# Patient Record
Sex: MALE | Race: WHITE | NOT HISPANIC OR LATINO | Employment: FULL TIME | URBAN - METROPOLITAN AREA
[De-identification: names, ages, dates, MRNs, and addresses within clinical notes are randomized per-mention and may not be internally consistent; named-entity substitution may affect disease eponyms.]

---

## 2021-03-05 ENCOUNTER — HOSPITAL ENCOUNTER (OUTPATIENT)
Dept: OTHER | Facility: HOSPITAL | Age: 41
Discharge: HOME | End: 2021-03-05
Attending: ORTHOPAEDIC SURGERY

## 2022-07-10 ENCOUNTER — HOSPITAL ENCOUNTER (EMERGENCY)
Facility: HOSPITAL | Age: 42
Discharge: HOME | End: 2022-07-10
Attending: EMERGENCY MEDICINE | Admitting: EMERGENCY MEDICINE

## 2022-07-10 VITALS
BODY MASS INDEX: 34.58 KG/M2 | TEMPERATURE: 97.3 F | SYSTOLIC BLOOD PRESSURE: 146 MMHG | HEART RATE: 103 BPM | RESPIRATION RATE: 18 BRPM | WEIGHT: 228.18 LBS | DIASTOLIC BLOOD PRESSURE: 87 MMHG | HEIGHT: 68 IN | OXYGEN SATURATION: 97 %

## 2022-07-10 DIAGNOSIS — H60.331 ACUTE SWIMMER'S EAR OF RIGHT SIDE: Primary | ICD-10-CM

## 2022-07-10 PROCEDURE — 99282 EMERGENCY DEPT VISIT SF MDM: CPT

## 2022-07-10 PROCEDURE — 99283 EMERGENCY DEPT VISIT LOW MDM: CPT

## 2022-07-10 PROCEDURE — 69210 REMOVE IMPACTED EAR WAX UNI: CPT

## 2022-07-10 NOTE — ED PROVIDER NOTES
"Time: 10:59 AM EDT  Arrived by: private car  Chief Complaint: Ear Drainage  History provided by: Patient  History is limited by: No limitations     History of Present Illness:  Patient is a 42 y.o. year old male who presents to the emergency department with drainage from his right ear.     Patient presents to the ED today for evaluation of drainage from his right ear. He states that he has been using OTC ear drops but he continues to have bleeding from his right ear. He believes he may have swimmer's ear. He denies any erythema of his ear canal but states that he has a \"crackling, popping\" sound in his ear. Patient does mention that he vomited once prior to his symptom onset, but he has not had any vomiting since that time.       History provided by:  Patient   used: No        Similar Symptoms Previously: No  Recently seen: No      Patient Care Team  Primary Care Provider: Jocelyn Moreno MD    Past Medical History:     Allergies   Allergen Reactions   • Morphine GI Bleeding     Past Medical History:   Diagnosis Date   • Diabetes mellitus (HCC)      Past Surgical History:   Procedure Laterality Date   • SHOULDER SURGERY Left      History reviewed. No pertinent family history.    Home Medications:  Prior to Admission medications    Not on File        Social History:   Social History     Tobacco Use   • Smoking status: Never Smoker   • Smokeless tobacco: Current User     Types: Chew   • Tobacco comment: for 20+ yrs   Substance Use Topics   • Alcohol use: Yes     Comment: occasionally   • Drug use: Defer     Recent travel: not applicable     Review of Systems:  Review of Systems   Constitutional: Negative for chills and fever.   HENT: Positive for ear pain (Drainage from right ear). Negative for congestion and sore throat.    Eyes: Negative for pain.   Respiratory: Negative for cough, chest tightness and shortness of breath.    Cardiovascular: Negative for chest pain.   Gastrointestinal: " "Negative for abdominal pain, diarrhea, nausea and vomiting.   Genitourinary: Negative for flank pain and hematuria.   Musculoskeletal: Negative for joint swelling.   Skin: Negative for pallor.   Neurological: Negative for seizures and headaches.   All other systems reviewed and are negative.       Physical Exam:  /87 (BP Location: Left arm, Patient Position: Sitting)   Pulse 103   Temp 97.3 °F (36.3 °C) (Oral)   Resp 18   Ht 172.7 cm (68\")   Wt 104 kg (228 lb 2.8 oz)   SpO2 97%   BMI 34.69 kg/m²     Physical Exam  Vitals and nursing note reviewed.   Constitutional:       General: He is not in acute distress.     Appearance: Normal appearance. He is not toxic-appearing.   HENT:      Head: Normocephalic and atraumatic.      Left Ear: Hearing, tympanic membrane, ear canal and external ear normal.      Ears:      Comments: Right ear noted to have thick, white \"cheesy\" discharge in the canal. No erythema or edema of the canal but spots of red blood noted. No pain with palpation of the right ear auricle or tragus.      Mouth/Throat:      Mouth: Mucous membranes are moist.   Eyes:      General: No scleral icterus.  Cardiovascular:      Rate and Rhythm: Normal rate and regular rhythm.      Pulses: Normal pulses.      Heart sounds: Normal heart sounds.   Pulmonary:      Effort: Pulmonary effort is normal. No respiratory distress.      Breath sounds: Normal breath sounds.   Abdominal:      General: Abdomen is flat.      Palpations: Abdomen is soft.      Tenderness: There is no abdominal tenderness.   Musculoskeletal:         General: Normal range of motion.      Cervical back: Normal range of motion and neck supple.   Skin:     General: Skin is warm and dry.   Neurological:      Mental Status: He is alert and oriented to person, place, and time. Mental status is at baseline.                Medications in the Emergency Department:  Medications - No data to display     Labs  Lab Results (last 24 hours)     ** No " results found for the last 24 hours. **           Imaging:  No Radiology Exams Resulted Within Past 24 Hours    Procedures:  Procedures    Progress                            Medical Decision Making:  MDM       This patient is a healthy-appearing 42-year-old male who is been having some fullness and decreased hearing in the right ear lately and figured he might of had swimmer's ear and started putting in some eardrops earlier in the week.    Yesterday, he got nauseated after eating a corn dog at a fair and vomited, and shortly thereafter noticed some red blood coming from the right ear canal and some drainage.    On my initial exam of the right ear canal there is a thick whitish cheesy appearing drainage and small spots of blood present.    We had our ED nurse clear out any drainage from the right ear canal using curette, and will reexamine.      On my reassessment via otoscopy of the right ear canal, he does have some discomfort during otoscopy in the ear canal and I note some canal edema and some whitish cheesy discharge and I am able to visualize some of the TM and do not see a perforation or erythema.    This exam following irrigation is mostly consistent with otitis externa, and I am starting him on some Cortisporin otic suspension and will have him follow-up with ENT.    Final diagnoses:   Acute swimmer's ear of right side        Disposition:  ED Disposition     ED Disposition   Discharge    Condition   Stable    Comment   --             This medical record created using voice recognition software.             Thai Sevilla  07/10/22 1209       Burke Crump MD  07/10/22 1231       Burke Crump MD  07/10/22 1233       Burke Crump MD  07/10/22 1242

## 2022-07-10 NOTE — DISCHARGE INSTRUCTIONS
Use the eardrops a few times a day this week to treat your ear canal infection, and you can call tomorrow for a follow-up appointment to see ENT in case your symptoms do not improve.

## 2022-10-13 ENCOUNTER — TRANSCRIBE ORDERS (OUTPATIENT)
Dept: ADMINISTRATIVE | Facility: HOSPITAL | Age: 42
End: 2022-10-13

## 2022-10-13 ENCOUNTER — LAB (OUTPATIENT)
Dept: LAB | Facility: HOSPITAL | Age: 42
End: 2022-10-13

## 2022-10-13 DIAGNOSIS — G89.4 CHRONIC PAIN SYNDROME: ICD-10-CM

## 2022-10-13 DIAGNOSIS — G89.4 CHRONIC PAIN SYNDROME: Primary | ICD-10-CM

## 2022-10-13 PROCEDURE — 85613 RUSSELL VIPER VENOM DILUTED: CPT

## 2022-10-13 PROCEDURE — 86200 CCP ANTIBODY: CPT

## 2022-10-13 PROCEDURE — 86160 COMPLEMENT ANTIGEN: CPT

## 2022-10-13 PROCEDURE — 36415 COLL VENOUS BLD VENIPUNCTURE: CPT

## 2022-10-13 PROCEDURE — 85652 RBC SED RATE AUTOMATED: CPT

## 2022-10-13 PROCEDURE — 85025 COMPLETE CBC W/AUTO DIFF WBC: CPT

## 2022-10-13 PROCEDURE — 86225 DNA ANTIBODY NATIVE: CPT

## 2022-10-13 PROCEDURE — 86038 ANTINUCLEAR ANTIBODIES: CPT

## 2022-10-13 PROCEDURE — 86235 NUCLEAR ANTIGEN ANTIBODY: CPT

## 2022-10-13 PROCEDURE — 85670 THROMBIN TIME PLASMA: CPT

## 2022-10-13 PROCEDURE — 85705 THROMBOPLASTIN INHIBITION: CPT

## 2022-10-13 PROCEDURE — 86431 RHEUMATOID FACTOR QUANT: CPT

## 2022-10-13 PROCEDURE — 86140 C-REACTIVE PROTEIN: CPT

## 2022-10-13 PROCEDURE — 85732 THROMBOPLASTIN TIME PARTIAL: CPT

## 2022-10-14 LAB
APTT SCREEN TO CONFIRM RATIO: 0.97 RATIO (ref 0–1.34)
BASOPHILS # BLD AUTO: 0.05 10*3/MM3 (ref 0–0.2)
BASOPHILS NFR BLD AUTO: 0.9 % (ref 0–1.5)
C3 SERPL-MCNC: 148 MG/DL (ref 82–167)
C4 SERPL-MCNC: 30 MG/DL (ref 14–44)
CHROMATIN AB SERPL-ACNC: <10 IU/ML (ref 0–14)
CONFIRM APTT/NORMAL: 39.2 SEC (ref 0–47.6)
CRP SERPL-MCNC: 0.56 MG/DL (ref 0–0.5)
DEPRECATED RDW RBC AUTO: 44.9 FL (ref 37–54)
DSDNA AB SER-ACNC: <1 IU/ML (ref 0–9)
ENA SS-A AB SER-ACNC: <0.2 AI (ref 0–0.9)
ENA SS-B AB SER-ACNC: <0.2 AI (ref 0–0.9)
EOSINOPHIL # BLD AUTO: 0.1 10*3/MM3 (ref 0–0.4)
EOSINOPHIL NFR BLD AUTO: 1.9 % (ref 0.3–6.2)
ERYTHROCYTE [DISTWIDTH] IN BLOOD BY AUTOMATED COUNT: 12.3 % (ref 12.3–15.4)
ERYTHROCYTE [SEDIMENTATION RATE] IN BLOOD: 15 MM/HR (ref 0–15)
HCT VFR BLD AUTO: 42.8 % (ref 37.5–51)
HGB BLD-MCNC: 14.2 G/DL (ref 13–17.7)
IMM GRANULOCYTES # BLD AUTO: 0.02 10*3/MM3 (ref 0–0.05)
IMM GRANULOCYTES NFR BLD AUTO: 0.4 % (ref 0–0.5)
LA 2 SCREEN W REFLEX-IMP: NORMAL
LYMPHOCYTES # BLD AUTO: 0.93 10*3/MM3 (ref 0.7–3.1)
LYMPHOCYTES NFR BLD AUTO: 17.6 % (ref 19.6–45.3)
MCH RBC QN AUTO: 32.8 PG (ref 26.6–33)
MCHC RBC AUTO-ENTMCNC: 33.2 G/DL (ref 31.5–35.7)
MCV RBC AUTO: 98.8 FL (ref 79–97)
MONOCYTES # BLD AUTO: 0.47 10*3/MM3 (ref 0.1–0.9)
MONOCYTES NFR BLD AUTO: 8.9 % (ref 5–12)
NEUTROPHILS NFR BLD AUTO: 3.7 10*3/MM3 (ref 1.7–7)
NEUTROPHILS NFR BLD AUTO: 70.3 % (ref 42.7–76)
NRBC BLD AUTO-RTO: 0 /100 WBC (ref 0–0.2)
PLATELET # BLD AUTO: 209 10*3/MM3 (ref 140–450)
PMV BLD AUTO: 11.5 FL (ref 6–12)
RBC # BLD AUTO: 4.33 10*6/MM3 (ref 4.14–5.8)
SCREEN APTT: 38.8 SEC (ref 0–51.9)
SCREEN DRVVT: 37.4 SEC (ref 0–47)
THROMBIN TIME: 17.2 SEC (ref 0–23)
WBC NRBC COR # BLD: 5.27 10*3/MM3 (ref 3.4–10.8)

## 2022-10-15 LAB — CCP IGA+IGG SERPL IA-ACNC: 7 UNITS (ref 0–19)

## 2022-10-17 LAB
ANA HOMOGEN TITR SER: ABNORMAL {TITER}
ANA SER QL IF: POSITIVE
ANA SPECKLED TITR SER: ABNORMAL {TITER}
Lab: ABNORMAL

## 2023-10-25 PROBLEM — G56.02 CARPAL TUNNEL SYNDROME OF LEFT WRIST: Status: ACTIVE | Noted: 2023-10-25

## 2023-10-25 NOTE — H&P
Orthopaedic & Hand Surgery  H&P Update  Dr. Doyle Alexandre  [phone]    Name: Abelardo Johnson   : 1980     Date: 10/27/23   Time: 17:11 EDT    ------  This document is the preoperative History and Physical and is an extension of the last clinic note that is copied below.  ------    I have reviewed the patient's prior notes, interviewed and examined the patient on the day of surgery in the holding room.  The patient's condition has not changed, there are no new treatments available that obviate the need for surgery. The need for surgery still exists.  I have reviewed the procedure with the patient, answered any last-minute questions and have personally marked the operative site.    Physical exam this morning is unchanged from prior with the addition of:   CV: Regular rate and rhythm.    Respiratory: Non-labored breathing.     Medications:  No current facility-administered medications on file prior to encounter.     Current Outpatient Medications on File Prior to Encounter   Medication Sig Dispense Refill    clotrimazole-betamethasone (LOTRISONE) 1-0.05 % cream apply topically to the affected area twice a day 30 g 0    Continuous Blood Gluc Sensor (Dexcom G6 Sensor) Apply 1 application topically to the appropriate area as directed Every 10 (Ten) Days. 3 each 5    Continuous Blood Gluc Sensor (Dexcom G6 Sensor) use as directed every day for 10 days 3 each 1    diclofenac (VOLTAREN) 50 MG EC tablet take 1 tablet by mouth twice daily 60 tablet 0    DULoxetine (CYMBALTA) 60 MG capsule Take 1 capsule by mouth Daily. 30 capsule 0    insulin detemir (Levemir) 100 UNIT/ML injection Inject 95 Units under the skin into the appropriate area as directed Daily. 90 mL 3    insulin lispro (HumaLOG) 100 UNIT/ML injection Inject 50 Units under the skin into the appropriate area as directed. (Patient taking differently: Inject 50 Units under the skin into the appropriate area as directed 3 (Three) Times a Day Before  "Meals.) 90 mL 0    Insulin Syringe-Needle U-100 (TRUEplus Insulin Syringe) 31G X 5/16\" 1 ML misc 1 application 4 (Four) Times a Day. 400 each 0    Insulin Syringe-Needle U-100 31G X 5/16\" 0.3 ML misc inject 1 under the skin as directed 4 times a day 1440 each 0    lamoTRIgine (LaMICtal) 100 MG tablet Take 2 tablets by mouth Daily.      lisinopril (PRINIVIL,ZESTRIL) 5 MG tablet Take 1 tablet by mouth daily. 90 tablet 1    methocarbamol (ROBAXIN) 750 MG tablet Take 1 tablet by mouth 3 times a day for 30 days. 90 tablet 1    rosuvastatin (CRESTOR) 40 MG tablet Take 1/2 tablet by mouth Every Night. 45 tablet 1    sildenafil (VIAGRA) 100 MG tablet take 1 tablet by mouth once daily as needed for erectile dysfunction 20 tablet 0    valACYclovir (VALTREX) 1000 MG tablet Take 1 tablet by mouth daily. 90 tablet 1       Allergies:  Allergies   Allergen Reactions    Morphine GI Bleeding       Past Medical History:  Patient Active Problem List   Diagnosis    Carpal tunnel syndrome of left wrist       Family History:  Family History   Problem Relation Age of Onset    Malig Hyperthermia Neg Hx          Review of Systems - Negative except as stated in the HPI    The most recent clinic note (with his HPI and indications for surgery today) is pasted below:    Name JACKIE BEAVER (44yo, M) ID# [id number] Appt. Date/Time 2023 01:50PM   1980 Service Dept[facility]  Provider MARY ELLEN PEREZ MD  Insurance   Med Primary[id number]MED[place]AC[place] - [place]MO)  Insurance # : [id number]  Policy/Group # : [id number]  Prescription: check now  Chief Complaint  Bilateral wrist pain  Patient's Pharmacies  [id number] ([facility]): [facility] [place] [address], Ph [phone], Fax [phone]  Vitals  2023 13:40  Ht: 5 ft 6.5 in  Wt: 218 lbs  BMI: 34.7  Body Surface Area: 2.15 m²  Allergies  Reviewed Allergies  MORPHINE: - morphine iv   Medications  Reviewed Medications  diclofenac " sodium 50 mg tablet,delayed release  Take 1 tablet(s) twice a day by oral route.  09/25/23   entered Doyle Alexandre MD  DULoxetine 60 mg capsule,delayed release  Take 1 capsule(s) every day by oral route.  09/25/23   entered Doyle Alexandre MD  HumaLOG KwikPen Insulin  09/25/23   entered Doyle Alexandre MD  lamoTRIgine  09/25/23   entered Doyle Alexandre MD  Levemir FlexPen 100 unit/mL (3 mL) solution subcutaneous insulin pen  Inject by subcutaneous route.  09/25/23   entered Doyle Alexandre MD  lisinopriL  09/25/23   entered Doyle Alexandre MD  methocarbamoL  09/25/23   entered Doyle Alexandre MD  pregabalin 150 mg capsule  Take 1 capsule(s) twice a day by oral route.  09/25/23   entered Doyle Alexandre MD  rosuvastatin 20 mg tablet  Take 1 tablet(s) every day by oral route.  09/25/23   entered Doyle Alexandre MD  valACYclovir  09/25/23   entered Doyle Alexandre MD  Vaccines  None recorded.  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 - Onset: 09/25/2023  Family History  Reviewed Family History  Father - No current problems or disability  Mother - No current problems or disability  Social History  Reviewed Social History  Public Health and Travel  Have you recently traveled abroad?: No  Have you been to an area known to be high risk for COVID-19?: No  In the 14 days before symptom onset, have you had close contact with a laboratory-confirmed COVID-19 while that case was ill?: No  In the 14 days before symptom onset, have you had close contact with a person who is under investigation for COVID-19 while that person was ill?: No  Substance Use  Do you or have you ever smoked tobacco?: Current every day smoker  How much tobacco do you smoke?: None  How many years have you smoked tobacco?: 1  At what age did you start smoking tobacco?:  "43  Do you or have you ever used any other forms of tobacco or nicotine?: Yes  Do you or have you ever used e-cigarettes or vape?: Never used electronic cigarettes  Do you or have you ever used smokeless tobacco?: Former smokeless tobacco user  How many years have you used smokeless tobacco?: 16  Has tobacco cessation counseling been provided?: No  What is your level of alcohol consumption?: None  Do you use any illicit or recreational drugs?: No  Advance Directive  Do you have an advance directive?: No  Do you have a medical power of ?: No  Surgical History  Reviewed Surgical History  Shoulder Surgery - 05/15/2021  Past Medical History  Reviewed Past Medical History  Anxiety Disorder: Y  Arthritis: Y  Depression: Y  Diabetes: Y  Fibromyalgia: Y  Headaches/Migraines: Y  Mental Illness: Y  HPI  44y/o right-hand-dominant male c/o bilateral hand numbness and stiffness    History obtained 9/25/2023:    Onset: 3 years prior  Location: Bilateral hands  Timing: Constant, worse at night, worse with activity and worse in the morning  Quality: Numbness and paresthetic pain with weakness of , decreased range of motion and stiffness.  Severity: 4/10 at best, 9/10 at worst  Modifying factors: Mildly improved with gabapentin (however, he notes that this makes him a \"zombie\") and diclofenac. He is trialed cock-up wrist bracing without improvement. Is undergone several sessions of hand therapy but did not find significant benefit (currently continues with hand exercises at home utilizing Theraputty). He has undergone corticosteroid injection on 5/18/2023 of the left carpal tunnel without significant improvement.  Context: He is diabetic with the most recent hemoglobin A1c of 7.5. He notes this was previously below 7, however, he is going through a divorce with increased family stressors. Symptoms include significant stiffness in his hands together with burning pain and numbness. He was seen and evaluated by Dr. Soler" Robert ([facility]) and diagnosed with a combination of diabetic cheiroarthropathy as well as left carpal tunnel syndrome (supported in part by electrodiagnostic studies). Due to a failure of conservative modalities, carpal tunnel release was considered, however, Dr. Jones noted that there is a guarded prognosis given the presence of hand stiffness and longstanding diabetes. He suggested a second opinion with me which the patient accepted.    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 He underwent shoulder surgery in May 2021 and experienced worsening numbness in his left arm after the block and surgery. He notes that due to difficulty with lifting following surgery, he lost his job and he is currently unemployed. He is partially supported by his mother and his chart. He is unable to pursue hobbies due to the pain in his hands, however, he notes that he previously enjoyed hunting, fishing and driving.    He notes he lives remotely, approximately 90 minutes of [place], and has limited access to therapy.  ROS  Patient reports arthralgias/joint pain.  Physical Exam  General:  The patient's general appearance was well-nourished, well-hydrated, no acute distress.  Orientation was alert and conversant    Musculoskeletal: Bilateral hands and wrists  Inspection: See clinical images below. He has visible Dupuytren's contracture bilaterally, worse on the left with extension predominantly into the ring and small finger. Skin is intact, warm and well perfused.  Palpation: Tender with squeeze at the level of the MP joints of the thumb and long finger bilaterally. Equivocal tenderness in the index and ring fingers bilaterally. Not tender in the small finger bilaterally. Not notably tender at the interphalangeal joints.  Sensation: Diminished bilaterally in both median and ulnar nerve distributions when compared with radial, he does not split the ring finger with regard to sensitivity. On the  right, he is more diminished in the median nerve distribution. On the left, he appears more diminished in the ring and small finger.  Orrum Kimi monofilament testing:  (Normal: 0.07 g, diminished: 0.4 g, diminished protective: 2 g, lost protective: 4 g, pressure only: 300 g)    Thumb: 10/2  Index: 10/2  Lon/2  Rin.4/10  Small: 0.4/2  Motor: 5/5 strength in thenar and abductor digiti minimi bilaterally. Able to flex, extend and abduct the fingers. Able to abduct the thumb orthogonal to the plane of the palm.  Vascular: Brisk cap refill to all digits and 2+ radial pulse. Modified Wayne's test not obtained due to inability to exsanguinate the palm bilaterally  Joint stability and range of motion: On attempted composite fist, the fingers come to approximately 1.5 cm from the distal palmar crease bilaterally. When he attempts to open them, they come to approximately 30 % of a normal arc of motion with residual stiffness. Able to oppose the thumb to the tip of the long finger bilaterally. Wrist range of motion in extension/flexion/pronation/supination is: Right side: 50° / 70°/full/65°. Left side: 45°/70°/85°/70°  Other: Tinel's at the wrist is positive bilaterally. Phalen's test is positive bilaterally. Tinel's over Au's ligament is negative bilaterally. Elbow flexion test is positive bilaterally. He has difficulty placing the hands flat on the table    CTS-6 Score: Right/left    1) Numbness predominantly or exclusively in median nerve territory (3.5): 0/0  2) Nocturnal numbness (4): 4/4  3) Thenar atrophy and/or weakness (5): 0/0  4) Positive Phalen's test (5): 5/5  5) Loss of two-point discrimination (4.5): 4.5/4.5  6) Positive Tinel sign (4): 4/4  Total: 17.5/17.5 out of 26  (>12 has 80% probability of carpal tunnel syndrome; greater than 5 has 25% probability of carpal tunnel syndrome)  Assessment / Plan  43-year-old right-hand-dominant male with:    1. Bilateral wrist carpal tunnel syndrome,  diagnosis by electrodiagnostic study on the left, history and physical exam. With minimal improvement following corticosteroid injection on the left. Also with a history of fairly significant diabetes with a likely component of diabetic neuropathy together with Cheiroarthropathy. In addition, on the left side, he had increased neuropathic pain following a block for shoulder arthroscopy. He has trialed gabapentin but without significant relief. We reviewed the condition today, including relevant anatomy, natural history and treatment options both conservative and surgical. Given that he has conservative modalities as noted above with incomplete remission, I recommended additional treatment. Given his history of diabetes, I would not recommend further attempts at steroid injection due to the risks of elevated blood glucose. Recommendation, therefore, is for surgical release given risk of irreversible nerve damage which can cause weakness in the thenar muscle groups. A thorough discussion of the risks, benefits and alternatives of this procedure ensued. Following our discussion, questions were solicited and answered to his satisfaction. He voiced an understanding of the nature of the condition, the indicated procedure, the risks and the alternatives. He requested surgery, beginning on the left and proceeding later to the right side in a staged manner after an interval of recuperation. Verbal consent was obtained. In keeping with facility practice, written consent will be obtained on the day of procedure.    It should be noted that he also has some concern for cubital tunnel syndrome. For now, I I am inclined to focus on carpal tunnel release as it can be performed under local anesthetic alone, given his prior complications from a nerve block. If he is having persistent paresthetic symptoms in the small finger, consideration for further treatment of cubital tunnel syndrome would be reasonable.    2. Diabetic  cheiroarthropathy, bilateral. We discussed the nature of the condition today, including the relevant anatomy, natural history and treatment options, focusing on conservative modalities. My recommendation is to continue with hand therapy exercises with gentle and relentless range of motion exercises. It is possible that after carpal tunnel release, he may experience some improvement if the confined space of the flexor tendons and median nerve is enlarged.    3. Bilateral hand Dupuytren's contracture, mild in nature with a greater degree of the contracture in his hands due to the above. For now, I recommended observation.    1. Bilateral wrist pain  M25.531: Pain in right wrist  M25.532: Pain in left wrist  XR, WRIST, 3 OR MORE VIEW  Side: BILATERAL    2. Bilateral carpal tunnel syndrome  G56.03: Carpal tunnel syndrome, bilateral upper limbs    3. 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 Dupuytren's contracture  M72.0: Palmar fascial fibromatosis [Dupuytren]    XR, WRIST, 3 OR MORE VIEW  Side: BILATERAL  Result:  - XRAY WRIST 3+ VIEW: Performed  Result Note: Radiographs of bilateral wrists (4 views) were obtained today and reviewed by me. My impression is: No acute osseous abnormalities identified. No degenerative changes noted. Alignment is normal. No foreign body seen.  Patient Instructions  1. Plan for surgery as noted below:  - Diagnosis: Bilateral carpal tunnel syndrome  - Proposed surgery: Left carpal tunnel release (CPT 41331)  - Special considerations/equipment: [To be performed under WALANT technique (local alone)]  - Risks of surgery: Pain, bleeding, infection (increased due to diabetes), damage to nerves, blood vessels or other surrounding structures, incomplete release and/or recurrence of the condition, stiffness, scar, further procedures, unforeseen risks of surgery including stroke and death.  -Alternatives to surgery: No  surgery, bracing, injections    2. He will need postoperative evaluation at [10 to 14 days] for wound inspection and suture removal.  3. Continue with gentle finger range of motion exercises as previously demonstrated in therapy.    Return to Office  to see Doyle Alexandre MD at [facility] on or around 09/25/2023  Encounter Sign-Off  Encounter signed-off by Doyle Alexandre MD, 09/25/2023.  Encounter performed and documented by Doyle Alexandre MD  Encounter reviewed & signed by Doyle Alexandre MD on 09/25/2023 at 9:57pm    -----    I have evaluated the patient and determined that he is stable and cleared for surgery in the ambulatory setting.      Doyle Alexandre MD, PhD  Orthopaedic & Hand Surgery  [facility]  [phone] - [facility]  [phone] - [facility]

## 2023-10-25 NOTE — DISCHARGE INSTRUCTIONS
Orthopaedic & Hand Surgery  Carpal Tunnel Release Discharge Instructions  Dr. Doyle Alexandre  [phone]    INCISION CARE  Wash your hands prior to dressing changes  The postoperative dressings should be taken off starting on postoperative day #4. New dry dressings can then be placed around the wound and held in place with an Ace wrap. Dressings should be changed daily.  No creams or ointments to the incision until 3 weeks post op.   It is okay to wash the incision with clean water (water you would drink) and soap but do not scrub. Do not soak your incision. After after showering or washing her hands, pat the wound dry gently and cover with new dry dressings.  Do not touch or pick at the incision  Check incision every day and notify surgeon immediately if any of the following signs or symptoms are seen:  Increase in redness  Increase in swelling around the incision and of the entire extremity  Increase in pain  NEW drainage or oozing from the incision  Pulling apart of the edges of the incision  Increase in overall body temperature (greater than 100.4°F)  Your surgeon will instruct you regarding suture or staple removal. In general, this happens at the 1-2-week post op appointment.    ACTIVITIES  Exercises:  Physical therapy may or may not be needed after surgery. Once some healing has occurred, it will be possible to start therapy if you wish. However, most patients get their function back fairly well after surgery without formal therapy.  Activities of Daily Living:   Showering may begin immediately if the wrist can be protected. The splint and incision cannot get wet after surgery.   No tub baths, hot tubs, or swimming pools for 4 weeks.  May shower and let water run over the incision once the sutures are removed if there is no drainage and the wound is well healing. A new dry dressing can be applied after showering.     Restrictions  Weight: Do not put weight on the wrist until instructed to do so. Your  surgeon will discuss with restrictions in terms of activities allowed. In general, anything more strenuous than holding a pen or piece of paper should be avoided, the other wrist should do the vast majority of the work until the soft tissues have healed enough that it is not painful, then it is ok to use the wrist more strenuously.   Driving: Many patients have questions about when it is safe to return to driving. The answer is that this is extremely variable. It depends on how quickly you heal. Until you can safely navigate the steering wheel, and are off of all narcotics, driving is not permitted. Your surgeon cannot “clear” you to return to driving, only you can make the decision when you feel it is safe.     Pain Control  Ice:  Ice is an excellent pain reliever. This can be used regardless of whether or not you are taking pain medication.  Apply an ice pack to the surgical area for 20 minutes at a time, removing it for at least an hour to prevent frostbite.  You should keep a towel between any dressings on the ice pack to prevent them from getting damp and from developing frostbite on the operative site.  Elevation:  Elevation is another easy way to control pain after surgery. Whenever possible, keep the operative limb elevated above the level of your heart to reduce swelling.  Acetaminophen (Tylenol):  CLASSIFICATION: A non-narcotic medication that is available without a prescription.  Acetaminophen controls pain but does not affect swelling or inflammation.  DRIVING: Acetaminophen will not impair your ability to drive. It is safe to drive while taking if your physical condition does not limit you.  POTENTIAL SIDE EFFECTS: nausea, stomach upset, liver failure if taken in large doses.  Interactions: Some narcotic medications contain acetaminophen. If you have a narcotic prescription, make sure to cut back on the acetaminophen if you are taking the narcotic. You should never take more 3000 mg of acetaminophen in  one 24-hour period.  DOSAGE:  Following surgery, you may take two regular strength (325 mg) tabs to control pain every 6 hours. This can be taken with NSAIDs (see below) or alternating the two.  After the initial surgical pain begins to resolve, you may begin to decrease the pain medication. By the end of a few weeks, you should be off of pain medications.  NSAIDS: This includes aspirin, ibuprofen, naproxen, Motrin, Aleve, Mobic, Celebrex  CLASSIFICATION: These are non-narcotic medications that are available without a prescription.  They are particularly effective at reducing swelling and inflammation  DRIVING: These medications will not impair your ability to drive. It is safe to drive while taking these medications if your physical condition does not limit you.  POTENTIAL SIDE EFFECTS: nausea, stomach upset, ulcer, gastric bleeding, kidney failure.  DOSAGE:  Following surgery, you may take ibuprofen (Motrin) 600 mg to control pain every 6 hours with food. It helps to take it scheduled (around the clock) to allow it to help reduce swelling.  After the initial surgical pain begins to resolve, you may begin to decrease the pain medication. By the end of a few weeks, you should be off of pain medications.    Medications  Anticoagulants: After upper extremity surgery most patients do not require an anticoagulant unless you have another injury that will be keeping you from mobilizing.  If you were already taking an anticoagulant (commonly Aspirin, Coumadin, or Plavix) you will likely be resuming your normal dose postoperatively and will be continuing that medication at the discretion of the prescribing physician.    FOLLOW-UP VISITS  You will need to follow up in the office with your surgeon in 1-2 weeks, or as instructed elsewhere in your discharge paperwork. Please call this number [phone] to schedule this appointment if one has not already been made.  If you have any concerns or suspected complications prior to  your follow up visit, please call the office. Do not wait until your appointment time if you suspect complications. These will need to be addressed in the office promptly.      Doyle Alexandre MD, PhD  Orthopaedic Surgery  [facility]

## 2023-10-26 RX ORDER — LAMOTRIGINE 100 MG/1
200 TABLET ORAL DAILY
COMMUNITY

## 2023-10-27 ENCOUNTER — HOSPITAL ENCOUNTER (OUTPATIENT)
Facility: HOSPITAL | Age: 43
Setting detail: HOSPITAL OUTPATIENT SURGERY
Discharge: HOME | End: 2023-10-27
Attending: STUDENT IN AN ORGANIZED HEALTH CARE EDUCATION/TRAINING PROGRAM | Admitting: STUDENT IN AN ORGANIZED HEALTH CARE EDUCATION/TRAINING PROGRAM
Payer: MEDICAID

## 2023-10-27 VITALS
RESPIRATION RATE: 16 BRPM | HEIGHT: 66 IN | BODY MASS INDEX: 34.4 KG/M2 | WEIGHT: 214.07 LBS | TEMPERATURE: 98 F | HEART RATE: 86 BPM | OXYGEN SATURATION: 99 % | DIASTOLIC BLOOD PRESSURE: 72 MMHG | SYSTOLIC BLOOD PRESSURE: 116 MMHG

## 2023-10-27 DIAGNOSIS — G56.02 CARPAL TUNNEL SYNDROME OF LEFT WRIST: Primary | ICD-10-CM

## 2023-10-27 LAB — GLUCOSE BLDC GLUCOMTR-MCNC: 129 MG/DL (ref 70–130)

## 2023-10-27 PROCEDURE — 82948 REAGENT STRIP/BLOOD GLUCOSE: CPT

## 2023-10-27 PROCEDURE — 25010000002 MIDAZOLAM PER 1 MG: Performed by: STUDENT IN AN ORGANIZED HEALTH CARE EDUCATION/TRAINING PROGRAM

## 2023-10-27 RX ORDER — TRAMADOL HYDROCHLORIDE 50 MG/1
50 TABLET ORAL EVERY 6 HOURS PRN
Qty: 20 TABLET | Refills: 0 | Status: SHIPPED | OUTPATIENT
Start: 2023-10-27

## 2023-10-27 RX ORDER — MIDAZOLAM HYDROCHLORIDE 1 MG/ML
1 INJECTION INTRAMUSCULAR; INTRAVENOUS ONCE
Status: COMPLETED | OUTPATIENT
Start: 2023-10-27 | End: 2023-10-27

## 2023-10-27 RX ORDER — MAGNESIUM HYDROXIDE 1200 MG/15ML
LIQUID ORAL AS NEEDED
Status: DISCONTINUED | OUTPATIENT
Start: 2023-10-27 | End: 2023-10-27 | Stop reason: HOSPADM

## 2023-10-27 RX ADMIN — MIDAZOLAM 1 MG: 1 INJECTION INTRAMUSCULAR; INTRAVENOUS at 18:43

## 2023-10-27 NOTE — OP NOTE
Orthopaedic & Hand Surgery  Carpal Tunnel Release Note  Dr. Doyle Alexandre  [phone]    PATIENT NAME: Abelardo Johnson  MRN: [id number]  : 1980 AGE: 43 y.o. GENDER: male  DATE OF OPERATION: 10/27/2023  PREOPERATIVE DIAGNOSIS:   Left Carpal Tunnel Syndrome  POSTOPERATIVE DIAGNOSIS:   Left Carpal Tunnel Syndrome  OPERATION PERFORMED:   Left Open Carpal Tunnel Release (CPT 87017), extensile  SURGEON: Doyle Alexandre MD, PhD  ANESTHESIA: 1% Lidocaine with epinephrine 1:100,000 buffered with bicarb.  ASSISTANT: None  ESTIMATED BLOOD LOSS: <10 ml  SPECIMENS: None  SPONGE AND NEEDLE COUNT: Correct  INDICATIONS: This patient is noted to have carpal tunnel syndrome in the affected wrist that failed nonoperative treatment. The risks of surgery were discussed and included the risk of anesthesia, infection, wound healing problems, damage to neurovascular structures, incomplete release or recurrence, loss of range of motion, the need for further procedures, medical complications, and others. No guarantees were made. The patient voiced understanding and a desire to proceed with surgery. Verbal and written consent were obtained.    DETAILS OF PROCEDURE:  The patient was met in the preoperative area. The site was marked. The consent and H&P were reviewed. The surgical site was then numbed with 1% Lidocaine with epinephrine 1:100,000 buffered with bicarb, 15 ml total. The patient was then transferred to the operative suite.    After induction of a satisfactory anesthetic, the patient was positioned supine with the Left arm extended on an arm board. The hand and arm was prepped and draped in normal sterile fashion which included alcohol, chlorhexidine and multiple layers of sterile draping.     A timeout was performed confirming the site and side of surgery, the antibiotic and allergy status and the presence of the necessary surgical equipment.     A one inch incision was designed in the palm in line with the  middle/ring web, making use of an existing crease. 3.5 power loupe magnification and the bipolar cautery were used.     The skin was incised and retracted and hemostasis was achieved. The palmar fascia was divided and the superficial palmar arch protected. The transverse fibers of the flexor retinaculum were then divided under direct vision from distal to proximal. The contents of the carpal tunnel were retracted radially and the proximal ligament and antebrachial fascia were divided, under direct vision to one and one half inches proximal to the wrist crease. Division of the transverse carpal ligament was confirmed visually and palpably with the edge of a freer elevator. Dissection was carried distally to a point visibly and palpably distal to the superficial palmar arch, completely opening the ligament distally and decompressing the median nerve. The nerve appeared compressed. It was dissected off of the overlying radial side of the flexor retinaculum, to which it was adherent. There were no anomalous branches of the median nerve or masses in the carpal tunnel.    At this point, however, the patient noted that he began to experience a fracture in the forearm. Out of concern of incomplete carpal tunnel release due to fact that he had Dupuytren's and a significant mount of fibrosis within the palm, we elected to proceed with an extensile approach. Incision was designed therefore across the wrist crease in a curvilinear manner proximally to approximately 2 inches proximal to the proximal wrist crease. Soft tissues were spread with tenotomy scissors. When this was done, we are able to identify that the antebrachial fascia remained undivided. This was therefore carefully divided using a combination of 15 blade and tenotomy scissors. At this point, we are able to confirm that the carpal tunnel was released completely from approximately 3 inches proximal to the wrist to beyond the superficial palmar arch.     The nerve  assumed a red and inflamed appearance following release of the ligament. Bleeding was controlled with the bipolar cautery and the wound irrigated with saline and closed with vertical mattress Prolene sutures and a vessel loop pledget.     Sterile bulky dressings were applied with the thumb and fingers free and the patient was taken to recovery in stable condition. The patient tolerated to procedure well with no immediate complications noted. The estimated blood loss was minimal. No specimens to pathology. Final sponge and instrument count were reported to me as correct.      POSTOPERATIVE PLAN:  The patient is to keep the hand elevated and use the hand lightly.  Remove dressings on postoperative day 4 and cover the wound with dry dressings  Okay to wait and cleanse the wound after 4 days with clean water and soap  Sutures to be removed on the first postoperative visit  Hand may be immersed and scar massage initiated 3 days after suture removal  Hand may be used gently and progressively for the next 4 weeks postoperative and then normal use and return to regular activities allowed. Anticipate some soreness in the palm with , pinch and palmar pressure for up to 6 months    Doyle Alexandre MD, PhD  Orthopaedic & Hand Surgery  [facility]  [phone] - [place] Office  [phone] - [place] Office

## 2023-10-27 NOTE — PERIOPERATIVE NURSING NOTE
Dr. Alexandre states patient can eat and drink and administer his insulin from home. Patient given box lunch

## 2024-01-15 PROBLEM — G56.01 CARPAL TUNNEL SYNDROME OF RIGHT WRIST: Status: ACTIVE | Noted: 2024-01-15

## 2024-01-15 RX ORDER — CEFAZOLIN SODIUM 2 G/100ML
2 INJECTION, SOLUTION INTRAVENOUS ONCE
Status: CANCELLED | OUTPATIENT
Start: 2024-01-19 | End: 2024-01-15

## 2024-01-15 NOTE — H&P
Orthopaedic & Hand Surgery  H&P Update  Dr. Doyle Alexandre  [phone]    Name: Abelardo Johnson   : 1980     Date: 24   Time: 10:06 EST    ------  This document is the preoperative History and Physical and is an extension of the last clinic note that is copied below.  ------    I have reviewed the patient's prior notes, interviewed and examined the patient on the day of surgery in the holding room.  The patient's condition has not changed, there are no new treatments available that obviate the need for surgery. The need for surgery still exists.  I have reviewed the procedure with the patient, answered any last-minute questions and have personally marked the operative site.    Physical exam this morning is unchanged from prior with the addition of:   CV: Regular rate and rhythm.    Respiratory: Non-labored breathing.     Medications:  No current facility-administered medications on file prior to encounter.     Current Outpatient Medications on File Prior to Encounter   Medication Sig Dispense Refill    Continuous Blood Gluc Sensor (Dexcom G6 Sensor) Apply 1 application topically to the appropriate area as directed Every 10 (Ten) Days. 3 each 5    Continuous Blood Gluc Sensor (Dexcom G6 Sensor) use as directed every day for 10 days 3 each 1    diclofenac (VOLTAREN) 50 MG EC tablet take 1 tablet by mouth twice daily (Patient taking differently: Take 1 tablet by mouth Daily As Needed. HELD FOR OR) 60 tablet 0    DULoxetine (CYMBALTA) 60 MG capsule Take 1 capsule by mouth Daily. 30 capsule 0    insulin detemir (Levemir) 100 UNIT/ML injection Inject 95 Units under the skin into the appropriate area as directed Daily. (Patient taking differently: Inject 95 Units under the skin into the appropriate area as directed Every Night.) 90 mL 3    insulin lispro (HumaLOG) 100 UNIT/ML injection Inject 50 Units under the skin into the appropriate area as directed. (Patient taking differently: Inject 50 Units under  "the skin into the appropriate area as directed 3 (Three) Times a Day Before Meals.) 90 mL 0    Insulin Syringe-Needle U-100 (TRUEplus Insulin Syringe) 31G X /16\" 1 ML misc 1 application 4 (Four) Times a Day. 400 each 0    Insulin Syringe-Needle U-100 31G X 5/16\" 0.3 ML misc inject 1 under the skin as directed 4 times a day 1440 each 0    lamoTRIgine (LaMICtal) 100 MG tablet Take 2 tablets by mouth Daily.      lisinopril (PRINIVIL,ZESTRIL) 5 MG tablet Take 1 tablet by mouth daily. (Patient taking differently: Take 1 tablet by mouth Every Night. TO HOLD NIGHT BEFORE SURGERY) 90 tablet 1    methocarbamol (ROBAXIN) 750 MG tablet Take 1 tablet by mouth 3 times a day for 30 days. (Patient not taking: Reported on 2024) 90 tablet 1    rosuvastatin (CRESTOR) 40 MG tablet Take 1/2 tablet by mouth Every Night. 45 tablet 1    sildenafil (VIAGRA) 100 MG tablet take 1 tablet by mouth once daily as needed for erectile dysfunction (Patient taking differently: Take 1 tablet by mouth As Needed for Erectile Dysfunction. TO HOLD 48 HOURS PRIOR TO OR) 20 tablet 0    valACYclovir (VALTREX) 1000 MG tablet Take 1 tablet by mouth daily. 90 tablet 1       Allergies:  Allergies   Allergen Reactions    Morphine GI Bleeding       Past Medical History:  Patient Active Problem List   Diagnosis    Carpal tunnel syndrome of left wrist    Carpal tunnel syndrome of right wrist       Family History:  Family History   Problem Relation Age of Onset    Malig Hyperthermia Neg Hx          Review of Systems - Negative except as stated in the HPI    The most recent clinic note (with his HPI and indications for surgery today) is pasted below:    Name JACKIE BEAVER (42yo, M) ID# [id number] Appt. Date/Time 2023 11:30AM   1980 Service Dept. LOC [facility]  Provider MARY ELLEN PEREZ MD  Insurance   [id number] - [place]MO)  Insurance # : [id number]  Policy/Group # : [id number]  Prescription: check " now  Chief Complaint  Followup: Bilateral carpal tunnel syndrome  Patient's Pharmacies  [id number] ([place]): [address] [place] [address], Ph [phone], Fax [phone]  Vitals  None recorded.  Allergies  Reviewed Allergies  MORPHINE: - morphine iv   Medications  Medications not reviewed (last reviewed 11/09/2023)  diclofenac sodium 50 mg tablet,delayed release  Take 1 tablet(s) twice a day by oral route.  09/25/23   entered Doyle Alexandre MD  DULoxetine 60 mg capsule,delayed release  Take 1 capsule(s) every day by oral route.  09/25/23   entered Doyle Alexandre MD  HumaLOG KwikPen Insulin  09/25/23   entered Doyle Alexandre MD  lamoTRIgine  09/25/23   entered Doyle Alexandre MD  Levemir FlexPen 100 unit/mL (3 mL) solution subcutaneous insulin pen  Inject by subcutaneous route.  09/25/23   entered Doyle Alexandre MD  lisinopriL  09/25/23   entered Doyle Alexandre MD  methocarbamoL  09/25/23   entered Doyle Alexandre MD  pregabalin 150 mg capsule  Take 1 capsule(s) twice a day by oral route.  09/25/23   entered Doyle Alexandre MD  rosuvastatin 20 mg tablet  Take 1 tablet(s) every day by oral route.  09/25/23   entered Doyle Alexandre MD  valACYclovir  09/25/23   entered Doyle Alexandre MD  Vaccines  None recorded.  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 - Onset: 09/25/2023  Family History  Family History not reviewed (last reviewed 09/25/2023)  Father - No current problems or disability  Mother - No current problems or disability  Social History  Social History not reviewed (last reviewed 09/25/2023)  Public Health and Travel  Have you recently traveled abroad?: No  Have you been to an area known to be high risk for COVID-19?: No  In the 14 days before symptom onset, have you had close  contact with a laboratory-confirmed COVID-19 while that case was ill?: No  In the 14 days before symptom onset, have you had close contact with a person who is under investigation for COVID-19 while that person was ill?: No  Substance Use  Do you or have you ever smoked tobacco?: Current every day smoker  How much tobacco do you smoke?: None  How many years have you smoked tobacco?: 1  At what age did you start smoking tobacco?: 43  Do you or have you ever used any other forms of tobacco or nicotine?: Yes  Do you or have you ever used e-cigarettes or vape?: Never used electronic cigarettes  Do you or have you ever used smokeless tobacco?: Former smokeless tobacco user  How many years have you used smokeless tobacco?: 16  Has tobacco cessation counseling been provided?: No  What is your level of alcohol consumption?: None  Do you use any illicit or recreational drugs?: No  Advance Directive  Do you have an advance directive?: No  Do you have a medical power of ?: No  Surgical History  Surgical History not reviewed (last reviewed 09/25/2023)  Shoulder Surgery - 05/15/2021  Past Medical History  Past Medical History not reviewed (last reviewed 09/25/2023)  Anxiety Disorder: Y  Arthritis: Y  Depression: Y  Diabetes: Y  Fibromyalgia: Y  Headaches/Migraines: Y  Mental Illness: Y  HPI  44y/o right-hand-dominant male c/o bilateral hand numbness and stiffness    Status post left extensile carpal tunnel release on 10/27/2023.    Still with numbness and tingling on the left side, however, this is no worse by his perception than on his previous evaluation and is ascribed to diabetic neuropathy. Notes tenderness in the base of the palm, particularly with any attempts to  or with pressure. He has been working at home utilizing Theraputty and directions from the occupational therapist. Still with stiffness in the hand.    That being said, he notes he is improved to the point where he would like to consider management  "of the right side. He notes he remains stiff and particularly numb and paresthetic.    Initial history obtained 9/25/2023:    Onset: 3 years prior  Location: Bilateral hands  Timing: Constant, worse at night, worse with activity and worse in the morning  Quality: Numbness and paresthetic pain with weakness of , decreased range of motion and stiffness.  Severity: 4/10 at best, 9/10 at worst  Modifying factors: Mildly improved with gabapentin (however, he notes that this makes him a \"zombie\") and diclofenac. He is trialed cock-up wrist bracing without improvement. Is undergone several sessions of hand therapy but did not find significant benefit (currently continues with hand exercises at home utilizing Theraputty). He has undergone corticosteroid injection on 5/18/2023 of the left carpal tunnel without significant improvement.  Context: He is diabetic with the most recent hemoglobin A1c of 7.5. He notes this was previously below 7, however, he is going through a divorce with increased family stressors. Symptoms include significant stiffness in his hands together with burning pain and numbness. He was seen and evaluated by Dr. Ryder Jones ([facility]) and diagnosed with a combination of diabetic cheiroarthropathy as well as left carpal tunnel syndrome (supported in part by electrodiagnostic studies). Due to a failure of conservative modalities, carpal tunnel release was considered, however, Dr. Jones noted that there is a guarded prognosis given the presence of hand stiffness and longstanding diabetes. He suggested a second opinion with me which the patient accepted.    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 He underwent shoulder surgery in May 2021 and experienced worsening numbness in his left arm after the block and surgery. He notes that due to difficulty with lifting following surgery, he lost his job and he is currently unemployed. He is partially supported by his " mother and his chart. He is unable to pursue hobbies due to the pain in his hands, however, he notes that he previously enjoyed hunting, fishing and driving.    He notes he lives remotely, approximately 90 minutes of [place], and has limited access to therapy.  Physical Exam  General:  The patient's general appearance was well-nourished, well-hydrated, no acute distress.  Orientation was alert and conversant    Musculoskeletal:  Left hand and wrist  Inspection: Surgical incision is well healed without erythema, fluctuance or drainage. He has visible Dupuytren's contracture with extension predominantly into the ring and small finger. Skin is intact, warm and well perfused.  Palpation: Tender over the thenar and hypothenar regions consistent with pillar pain from recent surgery but not out of proportion.  Sensation: Diminished in the median nerve distribution when compared with ulnar and radial. [employer] Kimi monofilament testing reveals 300 g for sensitivity in the long and ring fingers. It is 2 g force sensitivity in the thumb, index and small.  Motor: Able to flex, extend and abduct the fingers. Able to abduct the thumb orthogonal to the plane of the palm.  Vascular: Brisk cap refill to all digits and 2+ radial pulse.  Joint stability and range of motion: On attempted composite fist, the fingers come to approximately 1 cm from the distal palmar crease. When he attempts to open his hand, he has approximately 20 % of a normal arc of motion with residual stiffness. Able to oppose the thumb to the tip of the long finger, similar with preoperative state. Wrist range of motion is 55° extension and 75° flexion.    Inspection: Mild Dupuytren's contracture exists in the palm towards the ring finger, however, it is less severe than the left. Skin is intact, warm and well perfused.  Sensation: Diminished in median nerve distribution when compared with ulnar and radial, he splits the ring finger with regard to  sensitivity. On the right, he is more diminished in the median nerve distribution.  [employer] Kimi monofilament testing:  (Normal: 0.07 g, diminished: 0.4 g, diminished protective: 2 g, lost protective: 4 g, pressure only: 300 g)  Thumb: 300  Index: 300  Lon  Rin  Small: 0.4  Motor: 5/5 strength in thenar muscles. Able to flex, extend and abduct the fingers. Able to abduct the thumb orthogonal to the plane of the palm.  Vascular: Brisk cap refill to all digits and 2+ radial pulse.  Joint stability and range of motion: On attempted composite fist, the fingers come to approximately 2 cm from the distal palmar crease. When he attempts to open them, they come to approximately 20 % of a normal arc of motion with residual stiffness. Able to oppose the thumb to the tip of the long finger. Wrist range of motion is 50°/ 60°  Other: Tinel's at the wrist is positive. Phalen's test is positive. Although he has difficulty placing his hand flat on a table, this is a result of flexion contracture in the digits. The MP joints come to neutral.  Assessment / Plan  43-year-old right-hand-dominant male with bilateral wrist carpal tunnel syndrome, diagnosis by electrodiagnostic study on the left, history and physical exam. With minimal improvement following corticosteroid injection on the left. Also with a history of fairly significant diabetes with a likely component of diabetic neuropathy together with Cheiroarthropathy. In addition, on the left side, he had increased neuropathic pain following a block for shoulder arthroscopy. He has trialed gabapentin but without significant relief.    2. Diabetic cheiroarthropathy, bilateral hands. Also currently contributing to some of the stiffness he is experiencing in his hands.    3. Bilateral hand Dupuytren's contracture, mild in nature with a greater degree of the contracture in his hands due to the above. For now, I recommended observation.    Now status post:    1. Left wrist  "extensile carpal tunnel release on 10/27/2023. Improved with respect to some of his numbness and an improved sense of \"looseness in the arm\" that I think reflects decompression at the carpal tunnel with improvement in flexor tendon gliding. Still with some stiffness, however, wrist range of motion is improved. I urged him to continue therapeutic exercises at home. Unfortunately, he is far away from the closest hand therapist and so we will rely on home exercises that were generated for him at the time of his previous follow-up visit. I noted that at this point, I would not have any particular restriction for attempts to use, however, I noted that his current functional status in both hands is terrible. Due to his inability to , feel and manipulate objects and fully extend his hands, they are virtually useless to him.    With the right, he has persistent paresthetic pain and severe numbness in the hand together with stiffness. As noted above, this is largely a result of carpal tunnel syndrome, however, there is also likely a component of diabetic neuropathy and diabetic cheiroarthropathy similar to the contralateral side, I think he would obtain partial relief with carpal tunnel release. Due to the challenges based on the contralateral side with thickened skin and a difficult dissection, I recommended an extensile release to ensure complete release is adequately performed. In addition, we reviewed that although he was able to tolerate the previous procedure, this is not without some anxiety. As a result, I recommended performing the procedure with a tourniquet control and MAC regional anesthetic. We reviewed that this moderately elevates risk of medical comorbidities and mortality. A thorough review of the risks, benefits and alternatives of [this procedure] as well as the anticipated time course of recovery ensued. Following our discussion, questions were solicited and answered to his satisfaction. He voiced an " understanding of the nature of the condition, the indicated procedure, the risks and the alternatives. He requested surgery and verbal consent was obtained. In keeping with facility practice, written consent will be obtained on the day of procedure.    1. Bilateral carpal tunnel syndrome  G56.03: Carpal tunnel syndrome, bilateral upper limbs    2. 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 Dupuytren's contracture of finger  M72.0: Palmar fascial fibromatosis [Dupuytren]    4. Postoperative care  Z48.89: Encounter for other specified surgical aftercare    Patient Instructions  1. Plan for surgery as noted below:  - Diagnosis: Right carpal tunnel syndrome  - Proposed surgery: Right wrist extensile carpal tunnel release (CPT 40708)  - Special considerations/equipment: To be performed under MAC regional anesthetic with nonsterile tourniquet control.  - Risks of surgery: Pain, bleeding, infection, damage to nerves, blood vessels or other surrounding structures, incomplete release and/or recurrence of the condition, stiffness, scar, further procedures, unforeseen risks of surgery including stroke and death.  -Alternatives to surgery: No surgery, bracing, injections    2. He will need postoperative evaluation at 10 to 14 days for wound inspection and suture removal.    Return to Office  to see Doyle Alexandre MD at [facility] on or around 12/12/2023  Encounter Sign-Off  Encounter signed-off by Doyle Alexandre MD, 12/12/2023.  Encounter performed and documented by Doyle Alexandre MD  Encounter reviewed & signed by Doyle Alexandre MD on 12/12/2023 at 7:19pm    -----    I have evaluated the patient and determined that he is stable and cleared for surgery in the ambulatory setting.      Doyle Alexandre MD, PhD  Orthopaedic & Hand Surgery  [facility]  [phone] - [facility]  [phone]  [phone] [facility]

## 2024-01-15 NOTE — DISCHARGE INSTRUCTIONS
Orthopaedic & Hand Surgery  Discharge Instructions  Dr. Doyle Alexandre  [phone]    INCISION CARE  Wash your hands prior to dressing changes  The postoperative dressings should be taken off starting on postoperative day #4. New dry dressings can then be placed around the wound and held in place with an Ace wrap. Dressings should be changed daily.  No creams or ointments to the incision until 3 weeks post op.   It is okay to wash the incision with clean water (water you would drink) and soap but do not scrub. Do not soak your incision. After after showering or washing her hands, pat the wound dry gently and cover with new dry dressings.  Do not touch or pick at the incision  Check incision every day and notify surgeon immediately if any of the following signs or symptoms are seen:  Increase in redness  Increase in swelling around the incision and of the entire extremity  Increase in pain  NEW drainage or oozing from the incision  Pulling apart of the edges of the incision  Increase in overall body temperature (greater than 100.4°F)  Your surgeon will instruct you regarding suture or staple removal. In general, this happens at the 1-2-week post op appointment.    ACTIVITIES  Exercises:  Physical therapy may or may not be needed after surgery. Once some healing has occurred, it will be possible to start therapy if you wish. However, most patients get their function back fairly well after surgery without formal therapy.  Activities of Daily Living:   Showering may begin immediately if the wrist can be protected. The splint and incision cannot get wet after surgery.   No tub baths, hot tubs, or swimming pools for 4 weeks.  May shower and let water run over the incision once the sutures are removed if there is no drainage and the wound is well healing. A new dry dressing can be applied after showering.     Restrictions  Weight: Do not put weight on the wrist until instructed to do so. Your surgeon will discuss  with restrictions in terms of activities allowed. In general, anything more strenuous than holding a pen or piece of paper should be avoided, the other wrist should do the vast majority of the work until the soft tissues have healed enough that it is not painful, then it is ok to use the wrist more strenuously.   Driving: Many patients have questions about when it is safe to return to driving. The answer is that this is extremely variable. It depends on how quickly you heal. Until you can safely navigate the steering wheel, and are off of all narcotics, driving is not permitted. Your surgeon cannot “clear” you to return to driving, only you can make the decision when you feel it is safe.     Pain Control  Ice:  Ice is an excellent pain reliever. This can be used regardless of whether or not you are taking pain medication.  Apply an ice pack to the surgical area for 20 minutes at a time, removing it for at least an hour to prevent frostbite.  You should keep a towel between any dressings on the ice pack to prevent them from getting damp and from developing frostbite on the operative site.  Elevation:  Elevation is another easy way to control pain after surgery. Whenever possible, keep the operative limb elevated above the level of your heart to reduce swelling.  Acetaminophen (Tylenol):  CLASSIFICATION: A non-narcotic medication that is available without a prescription.  Acetaminophen controls pain but does not affect swelling or inflammation.  DRIVING: Acetaminophen will not impair your ability to drive. It is safe to drive while taking if your physical condition does not limit you.  POTENTIAL SIDE EFFECTS: nausea, stomach upset, liver failure if taken in large doses.  Interactions: Some narcotic medications contain acetaminophen. If you have a narcotic prescription, make sure to cut back on the acetaminophen if you are taking the narcotic. You should never take more 3000 mg of acetaminophen in one 24-hour  period.  DOSAGE:  Following surgery, you may take two regular strength (325 mg) tabs to control pain every 6 hours. This can be taken with NSAIDs (see below) or alternating the two.  After the initial surgical pain begins to resolve, you may begin to decrease the pain medication. By the end of a few weeks, you should be off of pain medications.  NSAIDS: This includes aspirin, ibuprofen, naproxen, Motrin, Aleve, Mobic, Celebrex  CLASSIFICATION: These are non-narcotic medications that are available without a prescription.  They are particularly effective at reducing swelling and inflammation  DRIVING: These medications will not impair your ability to drive. It is safe to drive while taking these medications if your physical condition does not limit you.  POTENTIAL SIDE EFFECTS: nausea, stomach upset, ulcer, gastric bleeding, kidney failure.  DOSAGE:  Following surgery, you may take ibuprofen (Motrin) 600 mg to control pain every 6 hours with food. It helps to take it scheduled (around the clock) to allow it to help reduce swelling.  After the initial surgical pain begins to resolve, you may begin to decrease the pain medication. By the end of a few weeks, you should be off of pain medications.    Medications  Anticoagulants: After upper extremity surgery most patients do not require an anticoagulant unless you have another injury that will be keeping you from mobilizing.  If you were already taking an anticoagulant (commonly Aspirin, Coumadin, or Plavix) you will likely be resuming your normal dose postoperatively and will be continuing that medication at the discretion of the prescribing physician.    FOLLOW-UP VISITS  You will need to follow up in the office with your surgeon in 1-2 weeks, or as instructed elsewhere in your discharge paperwork. Please call this number [phone] to schedule this appointment if one has not already been made.  If you have any concerns or suspected complications prior to your follow  up visit, please call the office. Do not wait until your appointment time if you suspect complications. These will need to be addressed in the office promptly.      Doyle Alexandre MD, PhD  Orthopaedic Surgery  [facility]

## 2024-01-16 ENCOUNTER — PRE-ADMISSION TESTING (OUTPATIENT)
Dept: PREADMISSION TESTING | Facility: HOSPITAL | Age: 44
End: 2024-01-16
Payer: MEDICAID

## 2024-01-16 ENCOUNTER — HOSPITAL ENCOUNTER (OUTPATIENT)
Dept: GENERAL RADIOLOGY | Facility: HOSPITAL | Age: 44
Discharge: HOME | End: 2024-01-16
Payer: MEDICAID

## 2024-01-16 VITALS
BODY MASS INDEX: 37.92 KG/M2 | WEIGHT: 250.2 LBS | HEART RATE: 92 BPM | DIASTOLIC BLOOD PRESSURE: 72 MMHG | OXYGEN SATURATION: 98 % | SYSTOLIC BLOOD PRESSURE: 109 MMHG | TEMPERATURE: 98.1 F | HEIGHT: 68 IN | RESPIRATION RATE: 20 BRPM

## 2024-01-16 LAB
ALBUMIN SERPL-MCNC: 4.4 G/DL (ref 3.5–5.2)
ALBUMIN/GLOB SERPL: 2.3 G/DL
ALP SERPL-CCNC: 95 U/L (ref 39–117)
ALT SERPL W P-5'-P-CCNC: 33 U/L (ref 1–41)
ANION GAP SERPL CALCULATED.3IONS-SCNC: 11 MMOL/L (ref 5–15)
AST SERPL-CCNC: 23 U/L (ref 1–40)
BASOPHILS # BLD AUTO: 0.07 10*3/MM3 (ref 0–0.2)
BASOPHILS NFR BLD AUTO: 1.4 % (ref 0–1.5)
BILIRUB SERPL-MCNC: 0.4 MG/DL (ref 0–1.2)
BILIRUB UR QL STRIP: NEGATIVE
BUN SERPL-MCNC: 11 MG/DL (ref 6–20)
BUN/CREAT SERPL: 12.8 (ref 7–25)
CALCIUM SPEC-SCNC: 9 MG/DL (ref 8.6–10.5)
CHLORIDE SERPL-SCNC: 102 MMOL/L (ref 98–107)
CLARITY UR: CLEAR
CO2 SERPL-SCNC: 25 MMOL/L (ref 22–29)
COLOR UR: YELLOW
CREAT SERPL-MCNC: 0.86 MG/DL (ref 0.76–1.27)
DEPRECATED RDW RBC AUTO: 41.4 FL (ref 37–54)
EGFRCR SERPLBLD CKD-EPI 2021: 111.6 ML/MIN/1.73
EOSINOPHIL # BLD AUTO: 0.31 10*3/MM3 (ref 0–0.4)
EOSINOPHIL NFR BLD AUTO: 6.1 % (ref 0.3–6.2)
ERYTHROCYTE [DISTWIDTH] IN BLOOD BY AUTOMATED COUNT: 12.1 % (ref 12.3–15.4)
GLOBULIN UR ELPH-MCNC: 1.9 GM/DL
GLUCOSE SERPL-MCNC: 166 MG/DL (ref 65–99)
GLUCOSE UR STRIP-MCNC: NEGATIVE MG/DL
HCT VFR BLD AUTO: 41.6 % (ref 37.5–51)
HGB BLD-MCNC: 13.9 G/DL (ref 13–17.7)
HGB UR QL STRIP.AUTO: NEGATIVE
KETONES UR QL STRIP: NEGATIVE
LEUKOCYTE ESTERASE UR QL STRIP.AUTO: NEGATIVE
LYMPHOCYTES # BLD AUTO: 1.1 10*3/MM3 (ref 0.7–3.1)
LYMPHOCYTES NFR BLD AUTO: 21.7 % (ref 19.6–45.3)
MCH RBC QN AUTO: 31.1 PG (ref 26.6–33)
MCHC RBC AUTO-ENTMCNC: 33.4 G/DL (ref 31.5–35.7)
MCV RBC AUTO: 93.1 FL (ref 79–97)
MONOCYTES # BLD AUTO: 0.47 10*3/MM3 (ref 0.1–0.9)
MONOCYTES NFR BLD AUTO: 9.3 % (ref 5–12)
NEUTROPHILS NFR BLD AUTO: 3.1 10*3/MM3 (ref 1.7–7)
NEUTROPHILS NFR BLD AUTO: 61.1 % (ref 42.7–76)
NITRITE UR QL STRIP: NEGATIVE
PH UR STRIP.AUTO: 5.5 [PH] (ref 5–8)
PLATELET # BLD AUTO: 180 10*3/MM3 (ref 140–450)
PMV BLD AUTO: 11.3 FL (ref 6–12)
POTASSIUM SERPL-SCNC: 4.6 MMOL/L (ref 3.5–5.2)
PROT SERPL-MCNC: 6.3 G/DL (ref 6–8.5)
PROT UR QL STRIP: NEGATIVE
RBC # BLD AUTO: 4.47 10*6/MM3 (ref 4.14–5.8)
SODIUM SERPL-SCNC: 138 MMOL/L (ref 136–145)
SP GR UR STRIP: 1.02 (ref 1–1.03)
UROBILINOGEN UR QL STRIP: NORMAL
WBC NRBC COR # BLD AUTO: 5.07 10*3/MM3 (ref 3.4–10.8)

## 2024-01-16 PROCEDURE — 80053 COMPREHEN METABOLIC PANEL: CPT

## 2024-01-16 PROCEDURE — 81003 URINALYSIS AUTO W/O SCOPE: CPT

## 2024-01-16 PROCEDURE — 36415 COLL VENOUS BLD VENIPUNCTURE: CPT

## 2024-01-16 PROCEDURE — 71046 X-RAY EXAM CHEST 2 VIEWS: CPT

## 2024-01-16 PROCEDURE — 85025 COMPLETE CBC W/AUTO DIFF WBC: CPT

## 2024-01-16 PROCEDURE — 93005 ELECTROCARDIOGRAM TRACING: CPT

## 2024-01-16 RX ORDER — PREGABALIN 225 MG/1
225 CAPSULE ORAL 2 TIMES DAILY
COMMUNITY

## 2024-01-16 RX ORDER — CHLORAL HYDRATE 500 MG
1000 CAPSULE ORAL
COMMUNITY

## 2024-01-16 RX ORDER — BACLOFEN 10 MG/1
10 TABLET ORAL 3 TIMES DAILY
COMMUNITY
Start: 2024-01-05

## 2024-01-16 RX ORDER — ASPIRIN 81 MG/1
81 TABLET ORAL DAILY
COMMUNITY

## 2024-01-16 NOTE — DISCHARGE INSTRUCTIONS
Take the following medications the morning of surgery:  DULOXETINE, LAMOTRIGINE, PREGABALIN  HOLD LISINOPRIL NIGHT BEFORE SURGERY    HOSPITAL TO CALL 1-2 DAYS PRIOR TO OR WITH ARRIVAL TIME FOR 1/19/24       If you are on prescription narcotic pain medication to control your pain you may also take that medication the morning of surgery.    General Instructions:  Do not eat solid food after midnight the night before surgery.  You may drink clear liquids day of surgery but must stop at least one hour before your hospital arrival time.  It is beneficial for you to have a clear drink that contains carbohydrates the day of surgery.  We suggest a 12 to 20 ounce bottle of Gatorade or Powerade for non-diabetic patients or a 12 to 20 ounce bottle of G2 or Powerade Zero for diabetic patients. (Pediatric patients, are not advised to drink a 12 to 20 ounce carbohydrate drink)    Clear liquids are liquids you can see through.  Nothing red in color.     Plain water                               Sports drinks  Sodas                                   Gelatin (Jell-O)  Fruit juices without pulp such as white grape juice and apple juice  Popsicles that contain no fruit or yogurt  Tea or coffee (no cream or milk added)  Gatorade / Powerade  G2 / Powerade Zero    Infants may have breast milk up to four hours before surgery.  Infants drinking formula may drink formula up to six hours before surgery.   Patients who avoid smoking, chewing tobacco and alcohol for 4 weeks prior to surgery have a reduced risk of post-operative complications.  Quit smoking as many days before surgery as you can.  Do not smoke, use chewing tobacco or drink alcohol the day of surgery.   If applicable bring your C-PAP/ BI-PAP machine in with you to preop day of surgery.  Bring any papers given to you in the doctor’s office.  Wear clean comfortable clothes.  Do not wear contact lenses, false eyelashes or make-up.  Bring a case for your glasses.   Bring crutches or  walker if applicable.  Remove all piercings.  Leave jewelry and any other valuables at home.  Hair extensions with metal clips must be removed prior to surgery.  The Pre-Admission Testing nurse will instruct you to bring medications if unable to obtain an accurate list in Pre-Admission Testing.            Preventing a Surgical Site Infection:  For 2 to 3 days before surgery, avoid shaving with a razor because the razor can irritate skin and make it easier to develop an infection.    Any areas of open skin can increase the risk of a post-operative wound infection by allowing bacteria to enter and travel throughout the body.  Notify your surgeon if you have any skin wounds / rashes even if it is not near the expected surgical site.  The area will need assessed to determine if surgery should be delayed until it is healed.  The night prior to surgery shower using a fresh bar of anti-bacterial soap (such as Dial) and clean washcloth.  Sleep in a clean bed with clean clothing.  Do not allow pets to sleep with you.  Shower on the morning of surgery using a fresh bar of anti-bacterial soap (such as Dial) and clean washcloth.  Dry with a clean towel and dress in clean clothing.  Ask your surgeon if you will be receiving antibiotics prior to surgery.  Make sure you, your family, and all healthcare providers clean their hands with soap and water or an alcohol based hand  before caring for you or your wound.    Day of surgery:  Your arrival time is approximately two hours before your scheduled surgery time.  Upon arrival, a Pre-op nurse and Anesthesiologist will review your health history, obtain vital signs, and answer questions you may have.  The only belongings needed at this time will be a list of your home medications and if applicable your C-PAP/BI-PAP machine.  A Pre-op nurse will start an IV and you may receive medication in preparation for surgery, including something to help you relax.     Please be aware  that surgery does come with discomfort.  We want to make every effort to control your discomfort so please discuss any uncontrolled symptoms with your nurse.   Your doctor will most likely have prescribed pain medications.      If you are going home after surgery you will receive individualized written care instructions before being discharged.  A responsible adult must drive you to and from the hospital on the day of your surgery and stay with you for 24 hours.  Discharge prescriptions can be filled by the hospital pharmacy during regular pharmacy hours.  If you are having surgery late in the day/evening your prescription may be e-prescribed to your pharmacy.  Please verify your pharmacy hours or chose a 24 hour pharmacy to avoid not having access to your prescription because your pharmacy has closed for the day.    If you are staying overnight following surgery, you will be transported to your hospital room following the recovery period.  [facility] has all private rooms.    If you have any questions please call Pre-Admission Testing at [phone].  Deductibles and co-payments are collected on the day of service. Please be prepared to pay the required co-pay, deductible or deposit on the day of service as defined by your plan.    Call your surgeon immediately if you experience any of the following symptoms:  Sore Throat  Shortness of Breath or difficulty breathing  Cough  Chills  Body soreness or muscle pain  Headache  Fever  New loss of taste or smell  Do not arrive for your surgery ill.  Your procedure will need to be rescheduled to another time.  You will need to call your physician before the day of surgery to avoid any unnecessary exposure to hospital staff as well as other patients.

## 2024-01-17 LAB
QT INTERVAL: 344 MS
QTC INTERVAL: 405 MS

## 2024-01-19 ENCOUNTER — ANESTHESIA EVENT (OUTPATIENT)
Dept: PERIOP | Facility: HOSPITAL | Age: 44
End: 2024-01-19
Payer: MEDICAID

## 2024-01-19 ENCOUNTER — HOSPITAL ENCOUNTER (OUTPATIENT)
Facility: HOSPITAL | Age: 44
Setting detail: HOSPITAL OUTPATIENT SURGERY
Discharge: HOME | End: 2024-01-19
Attending: STUDENT IN AN ORGANIZED HEALTH CARE EDUCATION/TRAINING PROGRAM | Admitting: STUDENT IN AN ORGANIZED HEALTH CARE EDUCATION/TRAINING PROGRAM
Payer: MEDICAID

## 2024-01-19 ENCOUNTER — ANESTHESIA (OUTPATIENT)
Dept: PERIOP | Facility: HOSPITAL | Age: 44
End: 2024-01-19
Payer: MEDICAID

## 2024-01-19 VITALS
SYSTOLIC BLOOD PRESSURE: 152 MMHG | TEMPERATURE: 97.8 F | RESPIRATION RATE: 18 BRPM | HEART RATE: 83 BPM | DIASTOLIC BLOOD PRESSURE: 67 MMHG | OXYGEN SATURATION: 96 %

## 2024-01-19 DIAGNOSIS — G56.01 CARPAL TUNNEL SYNDROME OF RIGHT WRIST: Primary | ICD-10-CM

## 2024-01-19 LAB — GLUCOSE BLDC GLUCOMTR-MCNC: 212 MG/DL (ref 70–130)

## 2024-01-19 PROCEDURE — 25010000002 PROPOFOL 10 MG/ML EMULSION: Performed by: REGISTERED NURSE

## 2024-01-19 PROCEDURE — 25010000002 DEXAMETHASONE PER 1 MG: Performed by: STUDENT IN AN ORGANIZED HEALTH CARE EDUCATION/TRAINING PROGRAM

## 2024-01-19 PROCEDURE — 25010000002 FENTANYL CITRATE (PF) 50 MCG/ML SOLUTION: Performed by: STUDENT IN AN ORGANIZED HEALTH CARE EDUCATION/TRAINING PROGRAM

## 2024-01-19 PROCEDURE — 25010000002 ROPIVACAINE PER 1 MG: Performed by: STUDENT IN AN ORGANIZED HEALTH CARE EDUCATION/TRAINING PROGRAM

## 2024-01-19 PROCEDURE — 82948 REAGENT STRIP/BLOOD GLUCOSE: CPT

## 2024-01-19 PROCEDURE — 25010000002 CEFAZOLIN IN DEXTROSE 2-4 GM/100ML-% SOLUTION: Performed by: REGISTERED NURSE

## 2024-01-19 PROCEDURE — 25810000003 LACTATED RINGERS PER 1000 ML: Performed by: STUDENT IN AN ORGANIZED HEALTH CARE EDUCATION/TRAINING PROGRAM

## 2024-01-19 PROCEDURE — 25010000002 MIDAZOLAM PER 1 MG: Performed by: STUDENT IN AN ORGANIZED HEALTH CARE EDUCATION/TRAINING PROGRAM

## 2024-01-19 RX ORDER — DEXAMETHASONE SODIUM PHOSPHATE 4 MG/ML
INJECTION, SOLUTION INTRA-ARTICULAR; INTRALESIONAL; INTRAMUSCULAR; INTRAVENOUS; SOFT TISSUE
Status: COMPLETED | OUTPATIENT
Start: 2024-01-19 | End: 2024-01-19

## 2024-01-19 RX ORDER — PROPOFOL 10 MG/ML
VIAL (ML) INTRAVENOUS AS NEEDED
Status: DISCONTINUED | OUTPATIENT
Start: 2024-01-19 | End: 2024-01-19 | Stop reason: SURG

## 2024-01-19 RX ORDER — EPHEDRINE SULFATE 50 MG/ML
5 INJECTION, SOLUTION INTRAVENOUS ONCE AS NEEDED
Status: DISCONTINUED | OUTPATIENT
Start: 2024-01-19 | End: 2024-01-19 | Stop reason: HOSPADM

## 2024-01-19 RX ORDER — SODIUM CHLORIDE 0.9 % (FLUSH) 0.9 %
3 SYRINGE (ML) INJECTION EVERY 12 HOURS SCHEDULED
Status: DISCONTINUED | OUTPATIENT
Start: 2024-01-19 | End: 2024-01-19 | Stop reason: HOSPADM

## 2024-01-19 RX ORDER — IPRATROPIUM BROMIDE AND ALBUTEROL SULFATE 2.5; .5 MG/3ML; MG/3ML
3 SOLUTION RESPIRATORY (INHALATION) ONCE AS NEEDED
Status: DISCONTINUED | OUTPATIENT
Start: 2024-01-19 | End: 2024-01-19 | Stop reason: HOSPADM

## 2024-01-19 RX ORDER — OXYCODONE AND ACETAMINOPHEN 7.5; 325 MG/1; MG/1
1 TABLET ORAL EVERY 4 HOURS PRN
Status: DISCONTINUED | OUTPATIENT
Start: 2024-01-19 | End: 2024-01-19 | Stop reason: HOSPADM

## 2024-01-19 RX ORDER — HYDROMORPHONE HYDROCHLORIDE 1 MG/ML
0.5 INJECTION, SOLUTION INTRAMUSCULAR; INTRAVENOUS; SUBCUTANEOUS
Status: DISCONTINUED | OUTPATIENT
Start: 2024-01-19 | End: 2024-01-19 | Stop reason: HOSPADM

## 2024-01-19 RX ORDER — FENTANYL CITRATE 50 UG/ML
50 INJECTION, SOLUTION INTRAMUSCULAR; INTRAVENOUS
Status: DISCONTINUED | OUTPATIENT
Start: 2024-01-19 | End: 2024-01-19 | Stop reason: HOSPADM

## 2024-01-19 RX ORDER — FLUMAZENIL 0.1 MG/ML
0.2 INJECTION INTRAVENOUS AS NEEDED
Status: DISCONTINUED | OUTPATIENT
Start: 2024-01-19 | End: 2024-01-19 | Stop reason: HOSPADM

## 2024-01-19 RX ORDER — FENTANYL CITRATE 50 UG/ML
50 INJECTION, SOLUTION INTRAMUSCULAR; INTRAVENOUS ONCE AS NEEDED
Status: COMPLETED | OUTPATIENT
Start: 2024-01-19 | End: 2024-01-19

## 2024-01-19 RX ORDER — MAGNESIUM HYDROXIDE 1200 MG/15ML
LIQUID ORAL AS NEEDED
Status: DISCONTINUED | OUTPATIENT
Start: 2024-01-19 | End: 2024-01-19 | Stop reason: HOSPADM

## 2024-01-19 RX ORDER — SODIUM CHLORIDE, SODIUM LACTATE, POTASSIUM CHLORIDE, CALCIUM CHLORIDE 600; 310; 30; 20 MG/100ML; MG/100ML; MG/100ML; MG/100ML
9 INJECTION, SOLUTION INTRAVENOUS CONTINUOUS
Status: DISCONTINUED | OUTPATIENT
Start: 2024-01-19 | End: 2024-01-19 | Stop reason: HOSPADM

## 2024-01-19 RX ORDER — HYDROCODONE BITARTRATE AND ACETAMINOPHEN 5; 325 MG/1; MG/1
1 TABLET ORAL ONCE AS NEEDED
Status: DISCONTINUED | OUTPATIENT
Start: 2024-01-19 | End: 2024-01-19 | Stop reason: HOSPADM

## 2024-01-19 RX ORDER — LABETALOL HYDROCHLORIDE 5 MG/ML
5 INJECTION, SOLUTION INTRAVENOUS
Status: DISCONTINUED | OUTPATIENT
Start: 2024-01-19 | End: 2024-01-19 | Stop reason: HOSPADM

## 2024-01-19 RX ORDER — DROPERIDOL 2.5 MG/ML
0.62 INJECTION, SOLUTION INTRAMUSCULAR; INTRAVENOUS
Status: DISCONTINUED | OUTPATIENT
Start: 2024-01-19 | End: 2024-01-19 | Stop reason: HOSPADM

## 2024-01-19 RX ORDER — DIPHENHYDRAMINE HYDROCHLORIDE 50 MG/ML
12.5 INJECTION INTRAMUSCULAR; INTRAVENOUS
Status: DISCONTINUED | OUTPATIENT
Start: 2024-01-19 | End: 2024-01-19 | Stop reason: HOSPADM

## 2024-01-19 RX ORDER — LIDOCAINE HYDROCHLORIDE 20 MG/ML
INJECTION, SOLUTION INFILTRATION; PERINEURAL AS NEEDED
Status: DISCONTINUED | OUTPATIENT
Start: 2024-01-19 | End: 2024-01-19 | Stop reason: SURG

## 2024-01-19 RX ORDER — NALOXONE HCL 0.4 MG/ML
0.2 VIAL (ML) INJECTION AS NEEDED
Status: DISCONTINUED | OUTPATIENT
Start: 2024-01-19 | End: 2024-01-19 | Stop reason: HOSPADM

## 2024-01-19 RX ORDER — MIDAZOLAM HYDROCHLORIDE 1 MG/ML
1 INJECTION INTRAMUSCULAR; INTRAVENOUS
Status: DISCONTINUED | OUTPATIENT
Start: 2024-01-19 | End: 2024-01-19 | Stop reason: HOSPADM

## 2024-01-19 RX ORDER — ROPIVACAINE HYDROCHLORIDE 5 MG/ML
INJECTION, SOLUTION EPIDURAL; INFILTRATION; PERINEURAL
Status: COMPLETED | OUTPATIENT
Start: 2024-01-19 | End: 2024-01-19

## 2024-01-19 RX ORDER — PROMETHAZINE HYDROCHLORIDE 25 MG/1
25 SUPPOSITORY RECTAL ONCE AS NEEDED
Status: DISCONTINUED | OUTPATIENT
Start: 2024-01-19 | End: 2024-01-19 | Stop reason: HOSPADM

## 2024-01-19 RX ORDER — ONDANSETRON 2 MG/ML
4 INJECTION INTRAMUSCULAR; INTRAVENOUS ONCE AS NEEDED
Status: DISCONTINUED | OUTPATIENT
Start: 2024-01-19 | End: 2024-01-19 | Stop reason: HOSPADM

## 2024-01-19 RX ORDER — LIDOCAINE HYDROCHLORIDE 10 MG/ML
0.5 INJECTION, SOLUTION INFILTRATION; PERINEURAL ONCE AS NEEDED
Status: DISCONTINUED | OUTPATIENT
Start: 2024-01-19 | End: 2024-01-19 | Stop reason: HOSPADM

## 2024-01-19 RX ORDER — HYDRALAZINE HYDROCHLORIDE 20 MG/ML
5 INJECTION INTRAMUSCULAR; INTRAVENOUS
Status: DISCONTINUED | OUTPATIENT
Start: 2024-01-19 | End: 2024-01-19 | Stop reason: HOSPADM

## 2024-01-19 RX ORDER — LIDOCAINE HYDROCHLORIDE AND EPINEPHRINE 10; 10 MG/ML; UG/ML
INJECTION, SOLUTION INFILTRATION; PERINEURAL AS NEEDED
Status: DISCONTINUED | OUTPATIENT
Start: 2024-01-19 | End: 2024-01-19 | Stop reason: HOSPADM

## 2024-01-19 RX ORDER — PROMETHAZINE HYDROCHLORIDE 25 MG/1
25 TABLET ORAL ONCE AS NEEDED
Status: DISCONTINUED | OUTPATIENT
Start: 2024-01-19 | End: 2024-01-19 | Stop reason: HOSPADM

## 2024-01-19 RX ORDER — SODIUM CHLORIDE 0.9 % (FLUSH) 0.9 %
3-10 SYRINGE (ML) INJECTION AS NEEDED
Status: DISCONTINUED | OUTPATIENT
Start: 2024-01-19 | End: 2024-01-19 | Stop reason: HOSPADM

## 2024-01-19 RX ORDER — TRAMADOL HYDROCHLORIDE 50 MG/1
50 TABLET ORAL EVERY 6 HOURS PRN
Qty: 28 TABLET | Refills: 0 | Status: SHIPPED | OUTPATIENT
Start: 2024-01-19

## 2024-01-19 RX ORDER — ACETAMINOPHEN 500 MG
1000 TABLET ORAL ONCE
Status: DISCONTINUED | OUTPATIENT
Start: 2024-01-19 | End: 2024-01-19 | Stop reason: HOSPADM

## 2024-01-19 RX ORDER — CEFAZOLIN SODIUM 2 G/100ML
INJECTION, SOLUTION INTRAVENOUS AS NEEDED
Status: DISCONTINUED | OUTPATIENT
Start: 2024-01-19 | End: 2024-01-19 | Stop reason: SURG

## 2024-01-19 RX ADMIN — SODIUM CHLORIDE, POTASSIUM CHLORIDE, SODIUM LACTATE AND CALCIUM CHLORIDE 9 ML/HR: 600; 310; 30; 20 INJECTION, SOLUTION INTRAVENOUS at 11:28

## 2024-01-19 RX ADMIN — SODIUM CHLORIDE, POTASSIUM CHLORIDE, SODIUM LACTATE AND CALCIUM CHLORIDE: 600; 310; 30; 20 INJECTION, SOLUTION INTRAVENOUS at 13:50

## 2024-01-19 RX ADMIN — PROPOFOL 80 MG: 10 INJECTION, EMULSION INTRAVENOUS at 12:43

## 2024-01-19 RX ADMIN — FENTANYL CITRATE 50 MCG: 50 INJECTION, SOLUTION INTRAMUSCULAR; INTRAVENOUS at 11:29

## 2024-01-19 RX ADMIN — LIDOCAINE HYDROCHLORIDE 60 MG: 20 INJECTION, SOLUTION INFILTRATION; PERINEURAL at 12:43

## 2024-01-19 RX ADMIN — CEFAZOLIN SODIUM 2 G: 2 INJECTION, SOLUTION INTRAVENOUS at 12:51

## 2024-01-19 RX ADMIN — MIDAZOLAM 1 MG: 1 INJECTION INTRAMUSCULAR; INTRAVENOUS at 11:28

## 2024-01-19 RX ADMIN — PROPOFOL 150 MCG/KG/MIN: 10 INJECTION, EMULSION INTRAVENOUS at 12:43

## 2024-01-19 RX ADMIN — DEXAMETHASONE SODIUM PHOSPHATE 4 MG: 4 INJECTION, SOLUTION INTRA-ARTICULAR; INTRALESIONAL; INTRAMUSCULAR; INTRAVENOUS; SOFT TISSUE at 11:36

## 2024-01-19 RX ADMIN — ROPIVACAINE HYDROCHLORIDE 29 ML: 5 INJECTION EPIDURAL; INFILTRATION; PERINEURAL at 11:36

## 2024-01-19 NOTE — ANESTHESIA PREPROCEDURE EVALUATION
Anesthesia Evaluation     Patient summary reviewed and Nursing notes reviewed   no history of anesthetic complications:   NPO Solid Status: > 8 hours  NPO Liquid Status: > 2 hours           Airway   Mallampati: II  TM distance: >3 FB  Neck ROM: full  Dental      Pulmonary    Cardiovascular     ECG reviewed    (+) hypertension, hyperlipidemia      Neuro/Psych  GI/Hepatic/Renal/Endo    (+) obesity, diabetes mellitus    Musculoskeletal     Abdominal    Substance History      OB/GYN          Other                          Anesthesia Plan    ASA 3     regional and MAC     intravenous induction     Anesthetic plan, risks, benefits, and alternatives have been provided, discussed and informed consent has been obtained with: patient.        CODE STATUS:

## 2024-01-19 NOTE — PERIOPERATIVE NURSING NOTE
Patient took 50 units humalog insulin at 0730 this am for blood sugar of 271  Registered a blood sugar of 212 on admit with unit glucometer at 10am  Prior to block blood sugar 196 per left arm Dexcom

## 2024-01-19 NOTE — OP NOTE
Orthopaedic & Hand Surgery  Carpal Tunnel Release Note  Dr. Doyle Alexandre  [phone]    PATIENT NAME: Abelardo Johnson  MRN: [id number]  : 1980 AGE: 43 y.o. GENDER: male  DATE OF OPERATION: 2024  PREOPERATIVE DIAGNOSIS:   Right carpal tunnel syndrome  POSTOPERATIVE DIAGNOSIS:   Right carpal tunnel syndrome  OPERATION PERFORMED:   Extensile Right carpal tunnel release (CPT 01425)  SURGEON: Doyle Alexandre MD, PhD  ANESTHESIA: Local and Sedation with Block  ASSISTANT: None  ESTIMATED BLOOD LOSS: <5 ml  SPONGE AND NEEDLE COUNT: Correct    INDICATIONS: This patient is noted to have carpal tunnel syndrome in the affected wrist that failed nonoperative treatment. Given significant stiffness of the tissues in the forearm on the contralateral side the head resulted in an extensile approach with the patient under local anesthetic, recommendation was made for this side also to proceed with extensile carpal tunnel incision. For patient comfort, plan was for surgery under MAC regional anesthetic with a block. The risks of surgery were discussed and included the risk of anesthesia, pain, infection, wound healing problems, damage to neurovascular structures, incomplete relief or recurrence, loss of range of motion, the need for further procedures, medical complications, and others. No guarantees were made. The patient voiced understanding and a desire to proceed with surgery. Verbal and written consent were obtained.    COMPONENTS:   None    SPECIMENS:   None    PERTINENT FINDINGS:   Thickened fibers of the transverse carpal ligament as well as significantly thickened antebrachial fascia were found to be compressive on the median nerve    TOURNIQUET TIME:   23 Minutes    DETAILS OF PROCEDURE:  The patient was met in the preoperative area. The site was marked. The consent and H&P were reviewed. The patient was then transferred to the operative suite and placed supine on the operative table. The patient submitted  to anesthesia. A tourniquet was placed on the right upper arm. Surgical alcohol was used to thoroughly clean the entire operative extremity. The arm was then prepped in the normal sterile fashion, which included Chloroprep and multiple layers of sterile drapes.    A timeout was performed confirming the site and side of surgery, the antibiotic and allergy status and the presence of the necessary surgical equipment.     The limb was exsanguinated with an Esmarch bandage and the tourniquet was inflated.    A extensile incision was designed in the palm in line with the middle/ring web. The incision was extended proximally across the wrist in the standard fashion. 3.5 power loupe magnification and the bipolar cautery were used.     The skin was incised and retracted and hemostasis achieved. The skin flap was elevated proximally and the antebrachial fascia exposed. The fascia was divided in its midline from distal to proximal exposing the median nerve. The transverse fibers of the flexor retinaculum were then divided from proximal to distal carefully protecting the adjacent nerve. Dissection was carried distally beyond the level of the superficial arch. The nerve appeared grossly adherent to the overlying radial fibers of the ligament from the level of the wrist crease to the central level of the carpal tunnel.  The nerve was dissected off of the overlying radial side of the flexor retinaculum and surrounding synovium. There were no anomalous branches of the median nerve or masses in the carpal tunnel.    At this point, we released tourniquet. Bleeding was controlled with the bipolar cautery and direct pressure. The nerve assumed a red and inflamed appearance following release of the tourniquet.    The wound irrigated with saline and closed in a layered manner.     Sterile dressings and a volar splint leaving the thumb and fingers free and the wrist in slight extension were applied and secured with and ACE bandage.  Anesthesia was reversed and the patient was taken to recovery in stable condition. The patient tolerated to procedure well with no immediate complications noted. The estimated blood loss was minimal. No specimens to pathology. Final sponge and instrument count were reported to me as correct.    POST-OPERATIVE PALN:  The patient is to keep the hand elevated, and to use the hand lightly.   Keep hand dry until sutures removed.   Sutures to be removed on the first post op visit if it appears that the wound is healing satisfactorily.  Shower OK one day after suture removal.   Hand may be immersed and scar massage started three days after suture removal.   Hand can be used gently but progressively until four weeks post op, then normal use and return to regular duty is allowed.  Anticipate soreness with , pinch and palmar pressure for up to six months.     Doyle Alexandre MD, PhD  Orthopaedic & Hand Surgery  [facility]  [phone] - [place] [facility]  [phone] - [facility]

## 2024-01-19 NOTE — ANESTHESIA PROCEDURE NOTES
Peripheral Block    Pre-sedation assessment completed: 1/19/2024 11:35 AM    Patient reassessed immediately prior to procedure    Patient location during procedure: pre-op  Start time: 1/19/2024 11:36 AM  Stop time: 1/19/2024 11:40 AM  Reason for block: at surgeon's request and post-op pain management  Performed by  Anesthesiologist: Smooth Daniel MD  Preanesthetic Checklist  Completed: patient identified, IV checked, site marked, risks and benefits discussed, surgical consent, monitors and equipment checked, pre-op evaluation and timeout performed  Prep:  Pt Position: supine  Sterile barriers:cap, mask and gloves  Prep: ChloraPrep  Patient monitoring: blood pressure monitoring, continuous pulse oximetry and EKG  Procedure    Sedation: yes  Performed under: local infiltration  Guidance:ultrasound guided    ULTRASOUND INTERPRETATION.  Using ultrasound guidance a 21 G gauge needle was placed in close proximity to the nerve, at which point, under ultrasound guidance anesthetic was injected in the area of the nerve and spread of the anesthesia was seen on ultrasound in close proximity thereto.  There were no abnormalities seen on ultrasound; a digital image was taken; and the patient tolerated the procedure with no complications. Images:still images obtained, printed/placed on chart    Laterality:right  Block Type:supraclavicular  Injection Technique:single-shot  Needle Type:echogenic and Tuohy  Needle Gauge:21 G  Resistance on Injection: none    Medications Used: ropivacaine (NAROPIN) 0.5 % injection - Injection   29 mL - 1/19/2024 11:36:00 AM  dexamethasone (DECADRON) injection - Injection   4 mg - 1/19/2024 11:36:00 AM      Post Assessment  Injection Assessment: negative aspiration for heme, no paresthesia on injection and incremental injection  Patient Tolerance:comfortable throughout block  Complications:no  Additional Notes  Ultrasound guidance used to visualize nerve anatomy, guide needle placement and  verify local anesthetic disbursement.

## 2024-01-19 NOTE — ANESTHESIA POSTPROCEDURE EVALUATION
Patient: Abelardo Johnson    Procedure Summary       Date: 01/19/24 Room / Location: [facility] [address] [place] [address] [facility]    Anesthesia Start: 1230 Anesthesia Stop: 1405    Procedure: RIGHT CARPAL TUNNEL RELEASE EXTENSILE APPROACH (Right: Wrist) Diagnosis:     Surgeons: Doyle Alexandre MD Provider: Render, Yonis Rae MD    Anesthesia Type: regional, MAC ASA Status: 3            Anesthesia Type: regional, MAC    Vitals  Vitals Value Taken Time   /67 01/19/24 1430   Temp 36.6 °C (97.8 °F) 01/19/24 1401   Pulse 83 01/19/24 1430   Resp 18 01/19/24 1430   SpO2 97 % 01/19/24 1430           Post Anesthesia Care and Evaluation    Patient location during evaluation: bedside  Patient participation: complete - patient participated  Level of consciousness: awake and alert  Pain management: adequate    Airway patency: patent  Anesthetic complications: No anesthetic complications  PONV Status: controlled  Cardiovascular status: blood pressure returned to baseline and acceptable  Respiratory status: acceptable  Hydration status: acceptable

## 2024-03-14 ENCOUNTER — APPOINTMENT (OUTPATIENT)
Dept: GENERAL RADIOLOGY | Facility: HOSPITAL | Age: 44
End: 2024-03-14
Payer: MEDICAID

## 2024-03-14 ENCOUNTER — APPOINTMENT (OUTPATIENT)
Facility: HOSPITAL | Age: 44
End: 2024-03-14
Payer: MEDICAID

## 2024-03-14 ENCOUNTER — HOSPITAL ENCOUNTER (EMERGENCY)
Facility: HOSPITAL | Age: 44
Discharge: HOME | End: 2024-03-14
Attending: EMERGENCY MEDICINE | Admitting: EMERGENCY MEDICINE
Payer: MEDICAID

## 2024-03-14 VITALS
RESPIRATION RATE: 25 BRPM | HEIGHT: 68 IN | HEART RATE: 87 BPM | SYSTOLIC BLOOD PRESSURE: 118 MMHG | BODY MASS INDEX: 38.49 KG/M2 | WEIGHT: 253.97 LBS | DIASTOLIC BLOOD PRESSURE: 82 MMHG | TEMPERATURE: 97.8 F | OXYGEN SATURATION: 96 %

## 2024-03-14 DIAGNOSIS — Z86.39 HISTORY OF DIABETES MELLITUS, TYPE II: ICD-10-CM

## 2024-03-14 DIAGNOSIS — E11.40 TYPE 2 DIABETES MELLITUS WITH DIABETIC NEUROPATHY, WITHOUT LONG-TERM CURRENT USE OF INSULIN: ICD-10-CM

## 2024-03-14 DIAGNOSIS — M79.89 BILATERAL SWELLING OF FEET: Primary | ICD-10-CM

## 2024-03-14 LAB
ALBUMIN SERPL-MCNC: 4.3 G/DL (ref 3.5–5.2)
ALBUMIN/GLOB SERPL: 1.7 G/DL
ALP SERPL-CCNC: 106 U/L (ref 39–117)
ALT SERPL W P-5'-P-CCNC: 28 U/L (ref 1–41)
ANION GAP SERPL CALCULATED.3IONS-SCNC: 9.6 MMOL/L (ref 5–15)
AST SERPL-CCNC: 24 U/L (ref 1–40)
BASOPHILS # BLD AUTO: 0.06 10*3/MM3 (ref 0–0.2)
BASOPHILS NFR BLD AUTO: 1.4 % (ref 0–1.5)
BH CV LOWER VASCULAR LEFT COMMON FEMORAL AUGMENT: NORMAL
BH CV LOWER VASCULAR LEFT COMMON FEMORAL COMPETENT: NORMAL
BH CV LOWER VASCULAR LEFT COMMON FEMORAL COMPRESS: NORMAL
BH CV LOWER VASCULAR LEFT COMMON FEMORAL PHASIC: NORMAL
BH CV LOWER VASCULAR LEFT COMMON FEMORAL SPONT: NORMAL
BH CV LOWER VASCULAR RIGHT COMMON FEMORAL AUGMENT: NORMAL
BH CV LOWER VASCULAR RIGHT COMMON FEMORAL COMPETENT: NORMAL
BH CV LOWER VASCULAR RIGHT COMMON FEMORAL COMPRESS: NORMAL
BH CV LOWER VASCULAR RIGHT COMMON FEMORAL PHASIC: NORMAL
BH CV LOWER VASCULAR RIGHT COMMON FEMORAL SPONT: NORMAL
BH CV LOWER VASCULAR RIGHT DISTAL FEMORAL COMPRESS: NORMAL
BH CV LOWER VASCULAR RIGHT GASTRONEMIUS COMPRESS: NORMAL
BH CV LOWER VASCULAR RIGHT GREATER SAPH AK AUGMENT: NORMAL
BH CV LOWER VASCULAR RIGHT GREATER SAPH AK COMPETENT: NORMAL
BH CV LOWER VASCULAR RIGHT GREATER SAPH AK COMPRESS: NORMAL
BH CV LOWER VASCULAR RIGHT GREATER SAPH AK PHASIC: NORMAL
BH CV LOWER VASCULAR RIGHT GREATER SAPH AK SPONT: NORMAL
BH CV LOWER VASCULAR RIGHT GREATER SAPH BK COMPRESS: NORMAL
BH CV LOWER VASCULAR RIGHT LESSER SAPH COMPRESS: NORMAL
BH CV LOWER VASCULAR RIGHT MID FEMORAL AUGMENT: NORMAL
BH CV LOWER VASCULAR RIGHT MID FEMORAL COMPETENT: NORMAL
BH CV LOWER VASCULAR RIGHT MID FEMORAL COMPRESS: NORMAL
BH CV LOWER VASCULAR RIGHT MID FEMORAL PHASIC: NORMAL
BH CV LOWER VASCULAR RIGHT MID FEMORAL SPONT: NORMAL
BH CV LOWER VASCULAR RIGHT PERONEAL AUGMENT: NORMAL
BH CV LOWER VASCULAR RIGHT PERONEAL COMPETENT: NORMAL
BH CV LOWER VASCULAR RIGHT PERONEAL COMPRESS: NORMAL
BH CV LOWER VASCULAR RIGHT PERONEAL PHASIC: NORMAL
BH CV LOWER VASCULAR RIGHT PERONEAL SPONT: NORMAL
BH CV LOWER VASCULAR RIGHT POPLITEAL AUGMENT: NORMAL
BH CV LOWER VASCULAR RIGHT POPLITEAL COMPETENT: NORMAL
BH CV LOWER VASCULAR RIGHT POPLITEAL COMPRESS: NORMAL
BH CV LOWER VASCULAR RIGHT POPLITEAL PHASIC: NORMAL
BH CV LOWER VASCULAR RIGHT POPLITEAL SPONT: NORMAL
BH CV LOWER VASCULAR RIGHT POSTERIOR TIBIAL AUGMENT: NORMAL
BH CV LOWER VASCULAR RIGHT POSTERIOR TIBIAL COMPETENT: NORMAL
BH CV LOWER VASCULAR RIGHT POSTERIOR TIBIAL COMPRESS: NORMAL
BH CV LOWER VASCULAR RIGHT POSTERIOR TIBIAL PHASIC: NORMAL
BH CV LOWER VASCULAR RIGHT POSTERIOR TIBIAL SPONT: NORMAL
BH CV LOWER VASCULAR RIGHT PROXIMAL FEMORAL COMPRESS: NORMAL
BILIRUB SERPL-MCNC: 0.4 MG/DL (ref 0–1.2)
BUN SERPL-MCNC: 8 MG/DL (ref 6–20)
BUN/CREAT SERPL: 10 (ref 7–25)
CALCIUM SPEC-SCNC: 9.1 MG/DL (ref 8.6–10.5)
CHLORIDE SERPL-SCNC: 104 MMOL/L (ref 98–107)
CO2 SERPL-SCNC: 26.4 MMOL/L (ref 22–29)
CREAT SERPL-MCNC: 0.8 MG/DL (ref 0.76–1.27)
D DIMER PPP FEU-MCNC: 0.38 MCGFEU/ML (ref 0–0.5)
DEPRECATED RDW RBC AUTO: 45.5 FL (ref 37–54)
EGFRCR SERPLBLD CKD-EPI 2021: 113.3 ML/MIN/1.73
EOSINOPHIL # BLD AUTO: 0.12 10*3/MM3 (ref 0–0.4)
EOSINOPHIL NFR BLD AUTO: 2.7 % (ref 0.3–6.2)
ERYTHROCYTE [DISTWIDTH] IN BLOOD BY AUTOMATED COUNT: 13.1 % (ref 12.3–15.4)
GLOBULIN UR ELPH-MCNC: 2.6 GM/DL
GLUCOSE SERPL-MCNC: 60 MG/DL (ref 65–99)
HCT VFR BLD AUTO: 42.5 % (ref 37.5–51)
HGB BLD-MCNC: 14.2 G/DL (ref 13–17.7)
HOLD SPECIMEN: NORMAL
HOLD SPECIMEN: NORMAL
IMM GRANULOCYTES # BLD AUTO: 0.03 10*3/MM3 (ref 0–0.05)
IMM GRANULOCYTES NFR BLD AUTO: 0.7 % (ref 0–0.5)
LYMPHOCYTES # BLD AUTO: 1.03 10*3/MM3 (ref 0.7–3.1)
LYMPHOCYTES NFR BLD AUTO: 23.5 % (ref 19.6–45.3)
MCH RBC QN AUTO: 31.6 PG (ref 26.6–33)
MCHC RBC AUTO-ENTMCNC: 33.4 G/DL (ref 31.5–35.7)
MCV RBC AUTO: 94.4 FL (ref 79–97)
MONOCYTES # BLD AUTO: 0.48 10*3/MM3 (ref 0.1–0.9)
MONOCYTES NFR BLD AUTO: 11 % (ref 5–12)
NEUTROPHILS NFR BLD AUTO: 2.66 10*3/MM3 (ref 1.7–7)
NEUTROPHILS NFR BLD AUTO: 60.7 % (ref 42.7–76)
NRBC BLD AUTO-RTO: 0 /100 WBC (ref 0–0.2)
NT-PROBNP SERPL-MCNC: <36 PG/ML (ref 0–450)
PLATELET # BLD AUTO: 200 10*3/MM3 (ref 140–450)
PMV BLD AUTO: 10.8 FL (ref 6–12)
POTASSIUM SERPL-SCNC: 4.2 MMOL/L (ref 3.5–5.2)
PROT SERPL-MCNC: 6.9 G/DL (ref 6–8.5)
RBC # BLD AUTO: 4.5 10*6/MM3 (ref 4.14–5.8)
SODIUM SERPL-SCNC: 140 MMOL/L (ref 136–145)
TROPONIN T SERPL HS-MCNC: <6 NG/L
WBC NRBC COR # BLD AUTO: 4.38 10*3/MM3 (ref 3.4–10.8)
WHOLE BLOOD HOLD COAG: NORMAL
WHOLE BLOOD HOLD SPECIMEN: NORMAL

## 2024-03-14 PROCEDURE — 99284 EMERGENCY DEPT VISIT MOD MDM: CPT

## 2024-03-14 PROCEDURE — 85025 COMPLETE CBC W/AUTO DIFF WBC: CPT | Performed by: EMERGENCY MEDICINE

## 2024-03-14 PROCEDURE — 93005 ELECTROCARDIOGRAM TRACING: CPT | Performed by: EMERGENCY MEDICINE

## 2024-03-14 PROCEDURE — 85379 FIBRIN DEGRADATION QUANT: CPT

## 2024-03-14 PROCEDURE — 83880 ASSAY OF NATRIURETIC PEPTIDE: CPT | Performed by: EMERGENCY MEDICINE

## 2024-03-14 PROCEDURE — 36415 COLL VENOUS BLD VENIPUNCTURE: CPT

## 2024-03-14 PROCEDURE — 93971 EXTREMITY STUDY: CPT

## 2024-03-14 PROCEDURE — 93005 ELECTROCARDIOGRAM TRACING: CPT

## 2024-03-14 PROCEDURE — 93971 EXTREMITY STUDY: CPT | Performed by: SURGERY

## 2024-03-14 PROCEDURE — 93010 ELECTROCARDIOGRAM REPORT: CPT | Performed by: INTERNAL MEDICINE

## 2024-03-14 PROCEDURE — 80053 COMPREHEN METABOLIC PANEL: CPT | Performed by: EMERGENCY MEDICINE

## 2024-03-14 PROCEDURE — 84484 ASSAY OF TROPONIN QUANT: CPT | Performed by: EMERGENCY MEDICINE

## 2024-03-14 PROCEDURE — 71045 X-RAY EXAM CHEST 1 VIEW: CPT

## 2024-03-14 RX ORDER — SODIUM CHLORIDE 0.9 % (FLUSH) 0.9 %
10 SYRINGE (ML) INJECTION AS NEEDED
Status: DISCONTINUED | OUTPATIENT
Start: 2024-03-14 | End: 2024-03-14 | Stop reason: HOSPADM

## 2024-03-14 RX ORDER — FUROSEMIDE 20 MG/1
20 TABLET ORAL DAILY
Qty: 3 TABLET | Refills: 0 | Status: SHIPPED | OUTPATIENT
Start: 2024-03-14 | End: 2024-03-17
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
## 2024-03-14 NOTE — DISCHARGE INSTRUCTIONS
Your lab work, chest x-ray and ultrasound was negative  Lab work shows no concerning factors for concerning CHF, CKD or liver disease.  Ultrasound was negative for blood clot  Please wear compression stockings, elevate at heart level and take Lasix for the next 2 to 3 days.    Please follow-up with your PCP in 2 to 3 days  please also follow-up with vascular

## 2024-03-15 LAB
QT INTERVAL: 340 MS
QTC INTERVAL: 414 MS

## 2024-03-19 ENCOUNTER — HOSPITAL ENCOUNTER (EMERGENCY)
Facility: HOSPITAL | Age: 44
Discharge: HOME | End: 2024-03-20
Attending: EMERGENCY MEDICINE | Admitting: EMERGENCY MEDICINE
Payer: MEDICAID

## 2024-03-19 ENCOUNTER — APPOINTMENT (OUTPATIENT)
Dept: GENERAL RADIOLOGY | Facility: HOSPITAL | Age: 44
End: 2024-03-19
Payer: MEDICAID

## 2024-03-19 DIAGNOSIS — R07.9 CHEST PAIN, UNSPECIFIED TYPE: ICD-10-CM
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
## 2024-03-19 DIAGNOSIS — S40.022A CONTUSION OF LEFT UPPER EXTREMITY, INITIAL ENCOUNTER: ICD-10-CM

## 2024-03-19 LAB
ALBUMIN SERPL-MCNC: 4.4 G/DL (ref 3.5–5.2)
ALBUMIN/GLOB SERPL: 1.7 G/DL
ALP SERPL-CCNC: 107 U/L (ref 39–117)
ALT SERPL W P-5'-P-CCNC: 26 U/L (ref 1–41)
ANION GAP SERPL CALCULATED.3IONS-SCNC: 13 MMOL/L (ref 5–15)
AST SERPL-CCNC: 26 U/L (ref 1–40)
BASOPHILS # BLD AUTO: 0.07 10*3/MM3 (ref 0–0.2)
BASOPHILS NFR BLD AUTO: 0.8 % (ref 0–1.5)
BILIRUB SERPL-MCNC: 0.4 MG/DL (ref 0–1.2)
BUN SERPL-MCNC: 14 MG/DL (ref 6–20)
BUN/CREAT SERPL: 15.2 (ref 7–25)
CALCIUM SPEC-SCNC: 9.2 MG/DL (ref 8.6–10.5)
CHLORIDE SERPL-SCNC: 103 MMOL/L (ref 98–107)
CO2 SERPL-SCNC: 26 MMOL/L (ref 22–29)
CREAT SERPL-MCNC: 0.92 MG/DL (ref 0.76–1.27)
DEPRECATED RDW RBC AUTO: 44.6 FL (ref 37–54)
EGFRCR SERPLBLD CKD-EPI 2021: 106.5 ML/MIN/1.73
EOSINOPHIL # BLD AUTO: 0.08 10*3/MM3 (ref 0–0.4)
EOSINOPHIL NFR BLD AUTO: 1 % (ref 0.3–6.2)
ERYTHROCYTE [DISTWIDTH] IN BLOOD BY AUTOMATED COUNT: 12.9 % (ref 12.3–15.4)
GLOBULIN UR ELPH-MCNC: 2.6 GM/DL
GLUCOSE SERPL-MCNC: 87 MG/DL (ref 65–99)
HCT VFR BLD AUTO: 41.6 % (ref 37.5–51)
HGB BLD-MCNC: 13.9 G/DL (ref 13–17.7)
HOLD SPECIMEN: NORMAL
HOLD SPECIMEN: NORMAL
IMM GRANULOCYTES # BLD AUTO: 0.03 10*3/MM3 (ref 0–0.05)
IMM GRANULOCYTES NFR BLD AUTO: 0.4 % (ref 0–0.5)
LIPASE SERPL-CCNC: 24 U/L (ref 13–60)
LYMPHOCYTES # BLD AUTO: 1.1 10*3/MM3 (ref 0.7–3.1)
LYMPHOCYTES NFR BLD AUTO: 13.3 % (ref 19.6–45.3)
MAGNESIUM SERPL-MCNC: 2.2 MG/DL (ref 1.6–2.6)
MCH RBC QN AUTO: 31.4 PG (ref 26.6–33)
MCHC RBC AUTO-ENTMCNC: 33.4 G/DL (ref 31.5–35.7)
MCV RBC AUTO: 94.1 FL (ref 79–97)
MONOCYTES # BLD AUTO: 0.62 10*3/MM3 (ref 0.1–0.9)
MONOCYTES NFR BLD AUTO: 7.5 % (ref 5–12)
NEUTROPHILS NFR BLD AUTO: 6.4 10*3/MM3 (ref 1.7–7)
NEUTROPHILS NFR BLD AUTO: 77 % (ref 42.7–76)
NRBC BLD AUTO-RTO: 0 /100 WBC (ref 0–0.2)
NT-PROBNP SERPL-MCNC: <36 PG/ML (ref 0–450)
PLATELET # BLD AUTO: 221 10*3/MM3 (ref 140–450)
PMV BLD AUTO: 10.9 FL (ref 6–12)
POTASSIUM SERPL-SCNC: 4.1 MMOL/L (ref 3.5–5.2)
PROT SERPL-MCNC: 7 G/DL (ref 6–8.5)
RBC # BLD AUTO: 4.42 10*6/MM3 (ref 4.14–5.8)
SODIUM SERPL-SCNC: 142 MMOL/L (ref 136–145)
TROPONIN T SERPL HS-MCNC: <6 NG/L
WBC NRBC COR # BLD AUTO: 8.3 10*3/MM3 (ref 3.4–10.8)
WHOLE BLOOD HOLD COAG: NORMAL
WHOLE BLOOD HOLD SPECIMEN: NORMAL

## 2024-03-19 PROCEDURE — 83690 ASSAY OF LIPASE: CPT

## 2024-03-19 PROCEDURE — 99284 EMERGENCY DEPT VISIT MOD MDM: CPT

## 2024-03-19 PROCEDURE — 96374 THER/PROPH/DIAG INJ IV PUSH: CPT

## 2024-03-19 PROCEDURE — 85379 FIBRIN DEGRADATION QUANT: CPT | Performed by: EMERGENCY MEDICINE

## 2024-03-19 PROCEDURE — 83735 ASSAY OF MAGNESIUM: CPT

## 2024-03-19 PROCEDURE — 85025 COMPLETE CBC W/AUTO DIFF WBC: CPT

## 2024-03-19 PROCEDURE — 93005 ELECTROCARDIOGRAM TRACING: CPT | Performed by: EMERGENCY MEDICINE

## 2024-03-19 PROCEDURE — 93005 ELECTROCARDIOGRAM TRACING: CPT

## 2024-03-19 PROCEDURE — 83880 ASSAY OF NATRIURETIC PEPTIDE: CPT

## 2024-03-19 PROCEDURE — 71045 X-RAY EXAM CHEST 1 VIEW: CPT

## 2024-03-19 PROCEDURE — 80053 COMPREHEN METABOLIC PANEL: CPT

## 2024-03-19 PROCEDURE — 84484 ASSAY OF TROPONIN QUANT: CPT

## 2024-03-19 RX ORDER — ASPIRIN 81 MG/1
324 TABLET, CHEWABLE ORAL ONCE
Status: COMPLETED | OUTPATIENT
Start: 2024-03-19 | End: 2024-03-19

## 2024-03-19 RX ORDER — SODIUM CHLORIDE 0.9 % (FLUSH) 0.9 %
10 SYRINGE (ML) INJECTION AS NEEDED
Status: DISCONTINUED | OUTPATIENT
Start: 2024-03-19 | End: 2024-03-20 | Stop reason: HOSPADM

## 2024-03-19 RX ADMIN — ASPIRIN 324 MG: 81 TABLET, CHEWABLE ORAL at 22:08

## 2024-03-20 ENCOUNTER — APPOINTMENT (OUTPATIENT)
Dept: CT IMAGING | Facility: HOSPITAL | Age: 44
End: 2024-03-20
Payer: MEDICAID

## 2024-03-20 ENCOUNTER — APPOINTMENT (OUTPATIENT)
Dept: GENERAL RADIOLOGY | Facility: HOSPITAL | Age: 44
End: 2024-03-20
Payer: MEDICAID

## 2024-03-20 VITALS
RESPIRATION RATE: 25 BRPM | TEMPERATURE: 98 F | OXYGEN SATURATION: 95 % | HEART RATE: 99 BPM | SYSTOLIC BLOOD PRESSURE: 113 MMHG | HEIGHT: 66 IN | DIASTOLIC BLOOD PRESSURE: 87 MMHG | BODY MASS INDEX: 42.62 KG/M2 | WEIGHT: 265.21 LBS

## 2024-03-20 LAB
D DIMER PPP FEU-MCNC: 0.5 MCGFEU/ML (ref 0–0.5)
GEN 5 2HR TROPONIN T REFLEX: <6 NG/L
QT INTERVAL: 311 MS
QT INTERVAL: 326 MS
QTC INTERVAL: 426 MS
QTC INTERVAL: 434 MS
TROPONIN T DELTA: NORMAL

## 2024-03-20 PROCEDURE — 36415 COLL VENOUS BLD VENIPUNCTURE: CPT | Performed by: EMERGENCY MEDICINE

## 2024-03-20 PROCEDURE — 96374 THER/PROPH/DIAG INJ IV PUSH: CPT

## 2024-03-20 PROCEDURE — 73060 X-RAY EXAM OF HUMERUS: CPT

## 2024-03-20 PROCEDURE — 70450 CT HEAD/BRAIN W/O DYE: CPT

## 2024-03-20 PROCEDURE — 25010000002 KETOROLAC TROMETHAMINE PER 15 MG: Performed by: EMERGENCY MEDICINE

## 2024-03-20 PROCEDURE — 93005 ELECTROCARDIOGRAM TRACING: CPT | Performed by: EMERGENCY MEDICINE

## 2024-03-20 PROCEDURE — 25810000003 SODIUM CHLORIDE 0.9 % SOLUTION: Performed by: EMERGENCY MEDICINE

## 2024-03-20 PROCEDURE — 84484 ASSAY OF TROPONIN QUANT: CPT | Performed by: EMERGENCY MEDICINE

## 2024-03-20 RX ORDER — KETOROLAC TROMETHAMINE 15 MG/ML
15 INJECTION, SOLUTION INTRAMUSCULAR; INTRAVENOUS ONCE
Status: COMPLETED | OUTPATIENT
Start: 2024-03-20 | End: 2024-03-20

## 2024-03-20 RX ADMIN — SODIUM CHLORIDE 1000 ML: 9 INJECTION, SOLUTION INTRAVENOUS at 02:15

## 2024-03-20 RX ADMIN — KETOROLAC TROMETHAMINE 15 MG: 15 INJECTION, SOLUTION INTRAMUSCULAR; INTRAVENOUS at 02:14

## 2024-03-20 NOTE — ED TRIAGE NOTES
PT TO ED FROM HOME WITH C/O FALL, FEET NUMBNESS, AND CHEST PAIN. DENIES DIZZINESS OR LIGHTHEADED. PATIENT WAS FOUND BY HIS NEIGHBOR. PATIENT FELL TWICE SO FAR. PATIENT UNSURE IF HE BLACKED OUT.
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
## 2025-05-10 ENCOUNTER — HOSPITAL ENCOUNTER (EMERGENCY)
Facility: HOSPITAL | Age: 45
Discharge: HOME | End: 2025-05-10
Attending: EMERGENCY MEDICINE
Payer: MEDICARE

## 2025-05-10 VITALS
DIASTOLIC BLOOD PRESSURE: 75 MMHG | WEIGHT: 268.96 LBS | HEART RATE: 93 BPM | SYSTOLIC BLOOD PRESSURE: 108 MMHG | RESPIRATION RATE: 18 BRPM | HEIGHT: 66 IN | BODY MASS INDEX: 43.23 KG/M2 | OXYGEN SATURATION: 95 % | TEMPERATURE: 97.9 F

## 2025-05-10 DIAGNOSIS — L05.91 PILONIDAL CYST: Primary | ICD-10-CM

## 2025-05-10 PROCEDURE — 99282 EMERGENCY DEPT VISIT SF MDM: CPT

## 2025-05-10 PROCEDURE — 25010000002 LIDOCAINE 1% - EPINEPHRINE 1:100000 1 %-1:100000 SOLUTION: Performed by: EMERGENCY MEDICINE

## 2025-05-10 RX ORDER — SULFAMETHOXAZOLE AND TRIMETHOPRIM 800; 160 MG/1; MG/1
1 TABLET ORAL 2 TIMES DAILY
Qty: 20 TABLET | Refills: 0 | Status: SHIPPED | OUTPATIENT
Start: 2025-05-10

## 2025-05-10 RX ORDER — CEPHALEXIN 500 MG/1
500 CAPSULE ORAL 3 TIMES DAILY
Qty: 30 CAPSULE | Refills: 0 | Status: SHIPPED | OUTPATIENT
Start: 2025-05-10

## 2025-05-10 RX ORDER — LIDOCAINE HYDROCHLORIDE AND EPINEPHRINE 10; 10 MG/ML; UG/ML
10 INJECTION, SOLUTION INFILTRATION; PERINEURAL ONCE
Status: COMPLETED | OUTPATIENT
Start: 2025-05-10 | End: 2025-05-10

## 2025-05-10 RX ADMIN — LIDOCAINE HYDROCHLORIDE,EPINEPHRINE BITARTRATE 10 ML: 10; .01 INJECTION, SOLUTION INFILTRATION; PERINEURAL at 01:19

## 2025-05-10 NOTE — DISCHARGE INSTRUCTIONS
Apply warm compresses to area.  Apply for 20 minutes 3-4 times per day.  Keep some gauze over top of the wound for drainage collection.  Take the antibiotics as prescribed.  Follow-up with your primary care provider in 1 week for wound check unless symptoms have completely resolved.  Return to the ER for change or worsening pain, fever greater than 101, or any other concerns issues that may arise.
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
## (undated) DEVICE — PATIENT RETURN ELECTRODE, SINGLE-USE, CONTACT QUALITY MONITORING, ADULT, WITH 9FT CORD, FOR PATIENTS WEIGING OVER 33LBS. (15KG): Brand: MEGADYNE

## (undated) DEVICE — DRESSING,GAUZE,XEROFORM,CURAD,1"X8",ST: Brand: CURAD

## (undated) DEVICE — SOL ISO/ALC 70PCT 4OZ

## (undated) DEVICE — VESSEL LOOPS X-RAY DETECTABLE: Brand: DEROYAL

## (undated) DEVICE — GLV SURG BIOGEL LTX PF 7

## (undated) DEVICE — BNDG,ELSTC,MATRIX,STRL,2"X5YD,LF,HOOK&LP: Brand: MEDLINE

## (undated) DEVICE — PK ORTHO MINOR TOWER 40

## (undated) DEVICE — WEBRIL* CAST PADDING: Brand: DEROYAL

## (undated) DEVICE — DISPOSABLE BIPOLAR FORCEPS 4" (10.2CM) JEWELERS, STRAIGHT 0.4MM TIP AND 12 FT. (3.6M) CABLE: Brand: KIRWAN

## (undated) DEVICE — SUT PROLN 4/0 FS2 18IN 8683G

## (undated) DEVICE — UNDRGLV SURG BIOGEL PUNCTUREINDICATION SZ7 PF STRL

## (undated) DEVICE — DRAPE,HAND,STERILE: Brand: MEDLINE

## (undated) DEVICE — APPL CHLORAPREP HI/LITE 26ML ORNG

## (undated) DEVICE — DRAPE,U/ SHT,SPLIT,PLAS,STERIL: Brand: MEDLINE

## (undated) DEVICE — RUBBERBAND LF STRL PK/2

## (undated) DEVICE — DISPOSABLE TOURNIQUET CUFF SINGLE BLADDER, SINGLE PORT AND QUICK CONNECT CONNECTOR: Brand: COLOR CUFF

## (undated) DEVICE — BNDG ELAS MATRX  2IN 5YD LF STRL

## (undated) DEVICE — SPNG GZ WOVN 4X4IN 12PLY 10/BX STRL

## (undated) DEVICE — ARM SLING: Brand: DEROYAL

## (undated) DEVICE — DRSNG GZ PETROLTM XEROFORM CURAD 1X8IN STRL